# Patient Record
Sex: MALE | Race: WHITE
[De-identification: names, ages, dates, MRNs, and addresses within clinical notes are randomized per-mention and may not be internally consistent; named-entity substitution may affect disease eponyms.]

---

## 2019-07-05 ENCOUNTER — HOSPITAL ENCOUNTER (OUTPATIENT)
Dept: HOSPITAL 53 - M SFHCCLAY | Age: 69
End: 2019-07-05
Attending: FAMILY MEDICINE
Payer: MEDICARE

## 2019-07-05 ENCOUNTER — HOSPITAL ENCOUNTER (OUTPATIENT)
Dept: HOSPITAL 53 - M CLY | Age: 69
End: 2019-07-05
Attending: FAMILY MEDICINE
Payer: MEDICARE

## 2019-07-05 DIAGNOSIS — R61: Primary | ICD-10-CM

## 2019-07-05 DIAGNOSIS — R74.8: ICD-10-CM

## 2019-07-05 LAB
BASOPHILS # BLD AUTO: 0.1 10^3/UL (ref 0–0.2)
BASOPHILS NFR BLD AUTO: 0.9 % (ref 0–1)
EOSINOPHIL # BLD AUTO: 0.4 10^3/UL (ref 0–0.5)
EOSINOPHIL NFR BLD AUTO: 4.3 % (ref 0–3)
HCT VFR BLD AUTO: 37.1 % (ref 42–52)
HGB BLD-MCNC: 12.1 G/DL (ref 13.5–17.5)
LYMPHOCYTES # BLD AUTO: 0.8 10^3/UL (ref 1.5–4.5)
LYMPHOCYTES NFR BLD AUTO: 8.8 % (ref 24–44)
MCH RBC QN AUTO: 26.8 PG (ref 27–33)
MCHC RBC AUTO-ENTMCNC: 32.6 G/DL (ref 32–36.5)
MCV RBC AUTO: 82.3 FL (ref 80–96)
MONOCYTES # BLD AUTO: 0.9 10^3/UL (ref 0–0.8)
MONOCYTES NFR BLD AUTO: 10.3 % (ref 0–5)
NEUTROPHILS # BLD AUTO: 6.8 10^3/UL (ref 1.8–7.7)
NEUTROPHILS NFR BLD AUTO: 75.1 % (ref 36–66)
PLATELET # BLD AUTO: 356 10^3/UL (ref 150–450)
RBC # BLD AUTO: 4.51 10^6/UL (ref 4.3–6.1)
T4 FREE SERPL-MCNC: 1.12 NG/DL (ref 0.76–1.46)
TSH SERPL DL<=0.005 MIU/L-ACNC: 1.5 UIU/ML (ref 0.36–3.74)
WBC # BLD AUTO: 9.1 10^3/UL (ref 4–10)

## 2019-07-05 NOTE — REP
Clinical:  Night sweats .

 

Comparison: 05/24/2017 .

 

Technique:  PA and lateral.

 

Findings:

The mediastinum and cardiac silhouette are normal.  The lung fields are clear and

without acute consolidation, effusion, or pneumothorax.  The skeletal structures

are intact and normal.

 

Impression:

1.   No acute cardiopulmonary process.

## 2019-07-08 LAB
ALP BONE CFR SERPL: 28 % (ref 12–68)
ALP INTEST CFR SERPL: 0 % (ref 0–18)
ALP LIVER CFR SERPL: 72 % (ref 13–88)
ALP SERPL-CCNC: 157 IU/L (ref 39–117)

## 2019-07-30 ENCOUNTER — HOSPITAL ENCOUNTER (OUTPATIENT)
Dept: HOSPITAL 53 - M OPP | Age: 69
Discharge: HOME | End: 2019-07-30
Attending: INTERNAL MEDICINE
Payer: MEDICARE

## 2019-07-30 VITALS — DIASTOLIC BLOOD PRESSURE: 72 MMHG | SYSTOLIC BLOOD PRESSURE: 133 MMHG

## 2019-07-30 VITALS — HEIGHT: 67 IN | WEIGHT: 171 LBS | BODY MASS INDEX: 26.84 KG/M2

## 2019-07-30 DIAGNOSIS — Z86.010: ICD-10-CM

## 2019-07-30 DIAGNOSIS — D12.2: ICD-10-CM

## 2019-07-30 DIAGNOSIS — Z79.82: ICD-10-CM

## 2019-07-30 DIAGNOSIS — I10: ICD-10-CM

## 2019-07-30 DIAGNOSIS — D12.3: ICD-10-CM

## 2019-07-30 DIAGNOSIS — D12.7: ICD-10-CM

## 2019-07-30 DIAGNOSIS — Z12.11: Primary | ICD-10-CM

## 2019-07-30 DIAGNOSIS — K64.8: ICD-10-CM

## 2019-07-30 DIAGNOSIS — F32.9: ICD-10-CM

## 2019-07-30 DIAGNOSIS — Z87.891: ICD-10-CM

## 2019-07-30 DIAGNOSIS — Z79.899: ICD-10-CM

## 2019-07-30 NOTE — ROOR
________________________________________________________________________________

Patient Name: Kal Cronin            Procedure Date: 7/30/2019 8:13 AM

MRN: [id number]                          Account Number: [id number]

YOB: 1950               Age: 69

Room: [facility]                            Gender: Male

Note Status: Finalized                 

________________________________________________________________________________

 

Procedure:           Colonoscopy

Indications:         High risk colon cancer surveillance: Personal history of 

                     colonic polyps

Providers:           Juan Tony MD

Referring MD:        Charles Yu MD ([place])

Requesting Provider: 

Medicines:           Monitored Anesthesia Care

Complications:       No immediate complications.

________________________________________________________________________________

Procedure:           Pre-Anesthesia Assessment:

                     - Prior to the procedure, a History and Physical was 

                     performed, and patient medications and allergies were 

                     reviewed. The patient is competent. The risks and 

                     benefits of the procedure and the sedation options and 

                     risks were discussed with the patient. All questions were 

                     answered and informed consent was obtained. Patient 

                     identification and proposed procedure were verified by 

                     the physician, the nurse and the anesthesiologist in the 

                     procedure room. Mental Status Examination: alert and 

                     oriented. Airway Examination: normal oropharyngeal airway 

                     and neck mobility. Respiratory Examination: clear to 

                     auscultation. CV Examination: normal. Prophylactic 

                     Antibiotics: The patient does not require prophylactic 

                     antibiotics. Prior Anticoagulants: The patient has taken 

                     no previous anticoagulant or antiplatelet agents. ASA 

                     Grade Assessment: II - A patient with mild systemic 

                     disease. After reviewing the risks and benefits, the 

                     patient was deemed in satisfactory condition to undergo 

                     the procedure. The anesthesia plan was to use monitored 

                     anesthesia care (MAC). Immediately prior to 

                     administration of medications, the patient was 

                     re-assessed for adequacy to receive sedatives. The heart 

                     rate, respiratory rate, oxygen saturations, blood 

                     pressure, adequacy of pulmonary ventilation, and response 

                     to care were monitored throughout the procedure. The 

                     physical status of the patient was re-assessed after the 

                     procedure.

                     The Colonoscope was introduced through the anus and 

                     advanced to the terminal ileum, with identification of 

                     the appendiceal orifice and IC valve. The colonoscopy was 

                     performed without difficulty. The patient tolerated the 

                     procedure well. The quality of the bowel preparation was 

                     good. The terminal ileum, ileocecal valve, appendiceal 

                     orifice, and rectum were photographed. Scope insertion 

                     time was 3 minutes. Scope withdrawal time was 11 minutes. 

                     The total duration of the procedure was 14 minutes.

                                                                                

Findings:

     The perianal and digital rectal examinations were normal.

     The terminal ileum appeared normal.

     Two flat and sessile polyps were found in the ascending colon. The polyps 

     were 4 to 12 mm in size. These polyps were removed with a cold snare. 

     Resection and retrieval were complete. Verification of patient 

     identification for the specimen was done by the physician and nurse using 

     the patient's name, birth date and medical record number. Estimated blood 

     loss was minimal.

     Four sessile polyps were found in the transverse colon. The polyps were 5 

     to 7 mm in size. These polyps were removed with a cold snare. Resection 

     and retrieval were complete.

     Three sessile polyps were found in the recto-sigmoid colon. The polyps 

     were 4 to 5 mm in size. These polyps were removed with a cold snare. 

     Resection and retrieval were complete.

     Non-bleeding external and internal hemorrhoids were found during 

     retroflexion. The hemorrhoids were medium-sized.

                                                                                

Impression:          - The examined portion of the ileum was normal.

                     - Two 4 to 12 mm polyps in the ascending colon, removed 

                     with a cold snare. Resected and retrieved.

                     - Four 5 to 7 mm polyps in the transverse colon, removed 

                     with a cold snare. Resected and retrieved.

                     - Three 4 to 5 mm polyps at the recto-sigmoid colon, 

                     removed with a cold snare. Resected and retrieved.

                     - Non-bleeding external and internal hemorrhoids.

Recommendation:      - Patient has a contact number available for emergencies. 

                     The signs and symptoms of potential delayed complications 

                     were discussed with the patient. Return to normal 

                     activities tomorrow. Written discharge instructions were 

                     provided to the patient.

                     - High fiber diet.

                     - Continue present medications.

                     - Await pathology results.

                     - Repeat colonoscopy in 3 - 5 years for surveillance 

                     based on pathology results.

                     - Telephone GI clinic for pathology results in 2 weeks.

                     - Return to primary care physician.

                     - Based on the biopsy results you will receive a phone 

                     call from GI clinic in 2-3 weeks to review the pathology 

                     results AND/OR your results will be faxed to your Primary 

                     care physician.

                                                                                

 

Juan Tony MD

_______________________

Juan Tony MD

7/30/2019 9:01:26 AM

Electronically signed by Juan Tony MD

Number of Addenda: 0

 

Note Initiated On: 7/30/2019 8:13 AM

Estimated Blood Loss:

     Estimated blood loss was minimal.

## 2019-09-26 ENCOUNTER — HOSPITAL ENCOUNTER (EMERGENCY)
Dept: HOSPITAL 53 - M ED | Age: 69
Discharge: HOME | End: 2019-09-26
Payer: MEDICARE

## 2019-09-26 VITALS — WEIGHT: 177.69 LBS | HEIGHT: 67 IN | BODY MASS INDEX: 27.89 KG/M2

## 2019-09-26 VITALS — SYSTOLIC BLOOD PRESSURE: 171 MMHG | DIASTOLIC BLOOD PRESSURE: 84 MMHG

## 2019-09-26 DIAGNOSIS — Z79.82: ICD-10-CM

## 2019-09-26 DIAGNOSIS — R93.5: Primary | ICD-10-CM

## 2019-09-26 DIAGNOSIS — R10.11: ICD-10-CM

## 2019-09-26 DIAGNOSIS — I10: ICD-10-CM

## 2019-09-26 DIAGNOSIS — Z79.899: ICD-10-CM

## 2019-09-26 LAB
ALBUMIN SERPL BCG-MCNC: 2.7 GM/DL (ref 3.2–5.2)
ALT SERPL W P-5'-P-CCNC: 42 U/L (ref 12–78)
AMYLASE SERPL-CCNC: 25 U/L (ref 25–115)
APPEARANCE UR: CLEAR
BACTERIA UR QL AUTO: NEGATIVE
BASOPHILS # BLD AUTO: 0.1 10^3/UL (ref 0–0.2)
BASOPHILS NFR BLD AUTO: 0.8 % (ref 0–1)
BILIRUB CONJ SERPL-MCNC: 0.2 MG/DL (ref 0–0.2)
BILIRUB SERPL-MCNC: 0.4 MG/DL (ref 0.2–1)
BILIRUB UR QL STRIP.AUTO: NEGATIVE
EOSINOPHIL # BLD AUTO: 0.4 10^3/UL (ref 0–0.5)
EOSINOPHIL NFR BLD AUTO: 3.1 % (ref 0–3)
GLUCOSE UR QL STRIP.AUTO: NEGATIVE MG/DL
HCT VFR BLD AUTO: 35.7 % (ref 42–52)
HGB BLD-MCNC: 11.4 G/DL (ref 13.5–17.5)
HGB UR QL STRIP.AUTO: NEGATIVE
KETONES UR QL STRIP.AUTO: NEGATIVE MG/DL
LEUKOCYTE ESTERASE UR QL STRIP.AUTO: NEGATIVE
LIPASE SERPL-CCNC: 62 U/L (ref 73–393)
LYMPHOCYTES # BLD AUTO: 0.8 10^3/UL (ref 1.5–5)
LYMPHOCYTES NFR BLD AUTO: 7.4 % (ref 24–44)
MCH RBC QN AUTO: 26.6 PG (ref 27–33)
MCHC RBC AUTO-ENTMCNC: 31.9 G/DL (ref 32–36.5)
MCV RBC AUTO: 83.2 FL (ref 80–96)
MONOCYTES # BLD AUTO: 0.8 10^3/UL (ref 0–0.8)
MONOCYTES NFR BLD AUTO: 7.2 % (ref 0–5)
MUCOUS THREADS URNS QL MICRO: (no result)
NEUTROPHILS # BLD AUTO: 9.2 10^3/UL (ref 1.5–8.5)
NEUTROPHILS NFR BLD AUTO: 80.9 % (ref 36–66)
NITRITE UR QL STRIP.AUTO: NEGATIVE
PH UR STRIP.AUTO: 6 UNITS (ref 5–9)
PLATELET # BLD AUTO: 364 10^3/UL (ref 150–450)
PROT SERPL-MCNC: 6.9 GM/DL (ref 6.4–8.2)
PROT UR QL STRIP.AUTO: NEGATIVE MG/DL
RBC # BLD AUTO: 4.29 10^6/UL (ref 4.3–6.1)
RBC # UR AUTO: 2 /HPF (ref 0–3)
SP GR UR STRIP.AUTO: 1.01 (ref 1–1.03)
SQUAMOUS #/AREA URNS AUTO: 0 /HPF (ref 0–6)
UROBILINOGEN UR QL STRIP.AUTO: 0.2 MG/DL (ref 0–2)
WBC # BLD AUTO: 11.4 10^3/UL (ref 4–10)
WBC #/AREA URNS AUTO: 1 /HPF (ref 0–3)

## 2019-09-26 PROCEDURE — 83690 ASSAY OF LIPASE: CPT

## 2019-09-26 PROCEDURE — 85379 FIBRIN DEGRADATION QUANT: CPT

## 2019-09-26 PROCEDURE — 96361 HYDRATE IV INFUSION ADD-ON: CPT

## 2019-09-26 PROCEDURE — 85025 COMPLETE CBC W/AUTO DIFF WBC: CPT

## 2019-09-26 PROCEDURE — 74175 CTA ABDOMEN W/CONTRAST: CPT

## 2019-09-26 PROCEDURE — 93005 ELECTROCARDIOGRAM TRACING: CPT

## 2019-09-26 PROCEDURE — 80076 HEPATIC FUNCTION PANEL: CPT

## 2019-09-26 PROCEDURE — 99284 EMERGENCY DEPT VISIT MOD MDM: CPT

## 2019-09-26 PROCEDURE — 82150 ASSAY OF AMYLASE: CPT

## 2019-09-26 PROCEDURE — 71275 CT ANGIOGRAPHY CHEST: CPT

## 2019-09-26 PROCEDURE — 93975 VASCULAR STUDY: CPT

## 2019-09-26 PROCEDURE — 81001 URINALYSIS AUTO W/SCOPE: CPT

## 2019-09-26 PROCEDURE — 96374 THER/PROPH/DIAG INJ IV PUSH: CPT

## 2019-09-26 PROCEDURE — 80047 BASIC METABLC PNL IONIZED CA: CPT

## 2019-09-26 NOTE — REPVR
PROCEDURE INFORMATION: 

Exam: US Duplex Artery or Vein of the Abdominal and/or Reproductive Organs, 

Limited 

Exam date and time: 9/26/2019 8:04 PM 

Clinical history: 69 years old, male; Abnormal findings; Abnormal radiologic 

finding, abdomen and pelvic vessels; Additional info: Evaluate mesenteric vein 

and pancreatic confluence 



TECHNIQUE: 

Imaging protocol: Real-time duplex ultrasound scan of the arterial or venous 

flow of the abdomen and/or reproductive organs, with color Doppler flow and 

spectral waveform analysis with image documentation. Exam focused on the region 

of clinical interest. Duplex images were received to evaluate vascular 

conditions. 



COMPARISON: 

No relevant prior studies available. 



FINDINGS: 

Portal venous: Splenic vein patent. Portal vein is patent. Superior mesenteric 

vein not visualized due to overlying bowel gas. 



IMPRESSION: 

Splenic vein patent. Portal vein is patent. Superior mesenteric vein not 

visualized due to overlying bowel gas. 



Electronically signed by: Serafin Elliott On 09/26/2019  20:31:51 PM

## 2019-09-26 NOTE — REPVR
PROCEDURE INFORMATION: 

Exam: CT Angiography Abdomen With Contrast 

Exam date and time: 9/26/2019 4:50 PM 

Clinical history: 69 years old, male; Abnormal findings; Abnormal lab test; 

Elevated d-dimer; Additional info: Elevated d dimer 



TECHNIQUE: 

Imaging protocol: Computed tomographic angiography images of the abdomen with 

intravenous contrast material. 

3D rendering: MIP reconstructed images were created and reviewed. 

Radiation optimization: All CT scans at this facility use at least one of these 

dose optimization techniques: automated exposure control; mA and/or kV 

adjustment per patient size (includes targeted exams where dose is matched to 

clinical indication); or iterative reconstruction. 

Contrast material: ISOVUE 370; Contrast volume: 100 ml; Contrast route: IV;  



COMPARISON: 

No relevant prior studies available. 



FINDINGS: 



VASCULATURE: 

Aorta: The aorta demonstrates moderate atherosclerotic calcification. No 

stenosis or aneurysm. 

Celiac trunk and mesenteric arteries: No occlusion or significant stenosis. 

Renal arteries: No occlusion or significant stenosis. 

Right iliac arteries: Mild atherosclerotic changes in the right iliac arteries. 

No stenosis or aneurysm. 

Left iliac arteries: Mild atherosclerotic changes in the left iliac arteries. 

No stenosis or aneurysm. 

Portal Venous System: There is nonopacification of the splenic vein extending 

from the confluence of the portal vein inferiorly for 5.4 cm. Although the 

finding may represent a thrombosis and possibility of nonopacification artifact 

related to arterial phase imaging should be considered as well. 



ABDOMEN: 

Liver: Examination of the liver demonstrates a lobular surface contour, and 

enlargement of the left and caudate lobes, findings consistent with cirrhosis. 

Hepatic steatosis. Hepatomegaly. There are multiple hypoattenuating foci 

demonstrated throughout the liver with the largest geographic focus bridging 

the anterior segment of the right lobe and medial segment of the left lobe of 

the liver measuring 7.2 x 9.3 x 8.2 cm. Findings worrisome for metastatic 

disease. 

Gallbladder and bile ducts: Normal. No calcified stones. No ductal dilation. 

Pancreas: Normal. No ductal dilation. 

Spleen: There is moderate splenomegaly with a maximum span of 17 centimeters. 

There is a hypoattenuating mass at the inferior pole of the spleen measuring 

5.9 x 5.5 x 4.7 cm. 

Adrenals: Normal. No mass. 

Kidneys and ureters: Bilateral renal cysts measure up to 1.7 x 2.6 cm in the 

left kidney. 

Stomach and bowel: Unremarkable. No obstruction. No mucosal thickening. 

Intraperitoneal space: Unremarkable. No free air. No significant fluid 

collection. 

Bones/joints: Moderate central spinal stenosis L3-4 and mild central spinal 

stenosis L4-5.The spine demonstrates mild degenerative changes. 

Soft tissues: Unremarkable. 

Lymph nodes: Unremarkable. No enlarged lymph nodes. 



IMPRESSION: 

1. Examination of the liver demonstrates findings consistent with cirrhosis. 

Hepatic steatosis. Hepatomegaly. There are multiple hypoattenuating foci 

demonstrated throughout the liver worrisome for metastatic disease. 

2. There is moderate splenomegaly with a maximum span of 17 centimeters. There 

is a hypoattenuating mass at the inferior pole of the spleen measuring 5.9 x 

5.5 x 4.7 cm. Finding may represent a metastasis.

3. Bilateral renal cysts measure up to 1.7 x 2.6 cm in the left kidney. 

4. There is nonopacification of the splenic vein extending from the confluence 

of the portal vein inferiorly for 5.4 cm. Although the finding may represent a 

thrombosis and possibility of nonopacification artifact related to arterial 

phase imaging should be considered as well. 



Electronically signed by: Serafin Elliott On 09/26/2019  17:39:34 PM

## 2019-09-26 NOTE — REPVR
PROCEDURE INFORMATION: 

Exam: CT Angiography Chest With Contrast 

Exam date and time: 9/26/2019 4:50 PM 

Clinical history: 69 years old, male; Abnormal findings; Abnormal diagnostic 

tests; Elevated d-dimer; Additional info: Elevated d dimer 



TECHNIQUE: 

Imaging protocol: Computed tomographic angiography of the chest with 

intravenous contrast. 

3D rendering: MIP reconstructed images were created and reviewed. 

Radiation optimization: All CT scans at this facility use at least one of these 

dose optimization techniques: automated exposure control; mA and/or kV 

adjustment per patient size (includes targeted exams where dose is matched to 

clinical indication); or iterative reconstruction. 

Contrast material: ISOVUE 370; Contrast volume: 100 ml; Contrast route: IV;  



COMPARISON: 

CR CHEST 2 VIEW 7/5/2019 8:24 AM 



FINDINGS: 

Pulmonary arteries: There are no pulmonary emboli. 

Aorta: The aorta demonstrates mild atherosclerotic calcification. No 

dissection/aneurysm. 

Lungs: Bibasilar atelectasis. Lungs otherwise clear. 

Pleural space: Unremarkable. No pneumothorax. No pleural effusion. 

Heart: Unremarkable. No cardiomegaly. No pericardial effusion. 

Lymph nodes: Unremarkable. No enlarged lymph nodes. 

Bones/joints: The spine demonstrates mild degenerative changes. 

Soft tissues: Unremarkable. 



IMPRESSION: 

1. There are no pulmonary emboli. 

2. No aortic dissection/aneurysm. 

3. No acute pulmonary parenchymal abnormalities. 



Electronically signed by: Serafin Elliott On 09/26/2019  17:28:18 PM

## 2019-09-27 NOTE — ECGEPIP
[facility] - ED

                                       

                                       Test Date:    2019

Pat Name:     MARYANN DUMONT            Department:   

Patient ID:   [id number]                 Room:         -

Gender:       Male                     Technician:   cindy

:          1950               Requested By: Madhavi REGAN [facility]

Order Number: [id number]     Reading MD:   Kevin العراقي

                                 Measurements

Intervals                              Axis          

Rate:         90                       P:            80

NH:           179                      QRS:          -23

QRSD:         88                       T:            18

QT:           345                                    

QTc:          423                                    

                           Interpretive Statements

SINUS RHYTHM

BORDERLINE LEFT AXIS DEVIATION

POSSIBLE INCOMPLETE RIGHT BUNDLE BRANCH BLOCK

NO PRIORS FOR COMPARISON

Electronically Signed on 2019 0:08:26 EDT by Kevin العراقي

## 2019-09-27 NOTE — ED PDOC
Post-Departure Follow-Up


dr verdugo faxed formal report of cta abd/p and doppler flow for fu mlg Lundborg-Gray,Maja MD          Sep 27, 2019 06:39

## 2019-09-30 NOTE — ED PDOC
Post-Departure Follow-Up


9/26/19 2000  ED Consult with Dr. Groves in regards to CT finding, after 

discussion Dr. Groves agreed with my decision to consult Dr. OZ Sorenson. 


9/26/19 2015  ED Consult with Dr. OZ Sorenson, discussed CT findings, after review 

of CT Dr. Sorenson stated a Mesenteric US could be completed for further diagnostic

purposes, however it would not change the plan of care as discussed. There would

be no vascular intervention at this time. Dr. Sorenson also stated, he would need a

follow up appointment for further discussion or evaluation. Mesenteric US was 

performed, Dr. Sorenson said he would review in the morning. Impression: No Splenic

vein thrombosis noted.











Madhavi Al FN           Sep 30, 2019 06:54

## 2019-10-14 ENCOUNTER — HOSPITAL ENCOUNTER (OUTPATIENT)
Dept: HOSPITAL 53 - M IRPRO | Age: 69
End: 2019-10-14
Attending: NURSE PRACTITIONER
Payer: MEDICARE

## 2019-10-14 VITALS — DIASTOLIC BLOOD PRESSURE: 72 MMHG | SYSTOLIC BLOOD PRESSURE: 142 MMHG

## 2019-10-14 DIAGNOSIS — K76.89: Primary | ICD-10-CM

## 2019-10-14 NOTE — REP
Ultrasound-guided liver biopsy

 

 

This procedure was performed by Brianne HAINES, under the direct

supervision of Dr. Castillo.

 

The risks and benefits of the procedure were explained to the patient and

informed consent was obtained both verbally and written.  Directly prior to the

start of the procedure, a formal timeout was done in the procedure room.

 

A mass in the left lobe of the liver was localized using ultrasound guidance.

The skin was prepped and draped in a sterile fashion. 8 ml of 1% lidocaine was

used as a local anesthetic.  Using ultrasound guidance a small skin nick was made

and a 19/20 gauge coaxial needle biopsy system was inserted and advanced into the

liver. 5 core biopsy samples were obtained and sent to the lab.

 

The patient tolerated the procedure well and there were no immediate

complications. After the appropriate monitored convalescence the patient was

discharged home from the department.

 

 

Reviewed by

ZAKI Rosenthal 10/14/2019 03:02 P

Electronically Signed by

Bernabe Castillo MD 10/14/2019 06:42 P

## 2019-10-15 ENCOUNTER — HOSPITAL ENCOUNTER (OUTPATIENT)
Dept: HOSPITAL 53 - M RAD | Age: 69
End: 2019-10-15
Attending: NURSE PRACTITIONER
Payer: MEDICARE

## 2019-10-15 DIAGNOSIS — N28.1: ICD-10-CM

## 2019-10-15 DIAGNOSIS — R16.2: Primary | ICD-10-CM

## 2019-10-15 PROCEDURE — 74183 MRI ABD W/O CNTR FLWD CNTR: CPT

## 2019-10-16 NOTE — REP
MRI ABDOMEN AND LIVER WITHOUT AND WITH IV CONTRAST:

 

HISTORY:  Liver masses.  Right upper quadrant pain.

 

Comparison CT study September 26, 2019.

 

Gadolinium enhancement dose is 14 mL of intravenous ProHance.

 

MR TECHNIQUE:  Axial and coronal imaging planes were utilized.  T1- and

T2-weighted sequences include spin-echo, fast spin echo, diffusion, gradient

echo, in- and out-of-phase, and dynamically acquired post contrast images.

 

MRI FINDINGS:  There is hepatosplenomegaly.  Multiple left and right lobe hepatic

mass lesions are seen most consistent with metastatic disease.  These range in

size from 1.5 cm to a 16 cm lobulated confluent lesion.  The larger lesions show

increased T2 signal intensity with central areas of hyperintense T2 signal

suggesting necrotic changes.  The lesions do show some contrast enhancement in

the periphery although generally less avid enhancement than the surrounding

normal hepatic parenchyma.  10-minute delayed images show more central

enhancement but there is no filling and/or central scar enhancement seen.  There

is a mass lesion in the spleen as well measuring 5.8 cm in greatest diameter.

This also shows a mild contrast enhancement.  There is no evidence of pancreatic

mass.  No definite abdominal adenopathy. The splenic vein and portal vein appear

normal and patent showing contrast enhancement on MRI study.

 

There is a small cyst in the left mid kidney.  No renal mass lesion is observed.

 

IMPRESSION: Multiple hepatic masses and a splenic mass compatible with metastatic

disease.  Small cyst left kidney.  No pancreatic lesion or definite adenopathy.

 

 

Electronically Signed by

Bernabe Castillo MD 10/16/2019 01:49 P

## 2019-10-22 ENCOUNTER — HOSPITAL ENCOUNTER (OUTPATIENT)
Dept: HOSPITAL 53 - M PLARAD | Age: 69
End: 2019-10-22
Attending: INTERNAL MEDICINE
Payer: MEDICARE

## 2019-10-22 DIAGNOSIS — R93.2: Primary | ICD-10-CM

## 2019-10-22 PROCEDURE — 78815 PET IMAGE W/CT SKULL-THIGH: CPT

## 2019-10-23 NOTE — REP
REASON:  Lymphoma.

 

PRIORS:  None.

 

After the intravenous administration of 8.67 of FDG 18 triplane whole body PET/CT

was performed from the skull base to the mid thigh.

 

There is no abnormal hypermetabolic activity in the neck.  There is no abnormal

hypermetabolic activity in the chest.

 

There are numerable focal and confluent areas of grossly abnormal hypermetabolic

activity seen throughout the hepatic parenchyma and one large area of similar

characteristics in the spleen.  These SUV values range from 5 to 17.  There are

no other abnormal areas of hypermetabolic activity seen in the abdomen or pelvis.

 

 

IMPRESSION:

 

Gross abnormal hypermetabolic activity seen in the liver and spleen.  Etiology

uncertain.  Hemangiomas are not typically hypermetabolic on FDG 18 PET scanning.

 

 

 

Electronically Signed by

Josh Mesa DO 10/23/2019 04:30 P

## 2019-10-30 ENCOUNTER — HOSPITAL ENCOUNTER (OUTPATIENT)
Dept: HOSPITAL 53 - M IRPRO | Age: 69
End: 2019-10-30
Attending: INTERNAL MEDICINE
Payer: MEDICARE

## 2019-10-30 VITALS — DIASTOLIC BLOOD PRESSURE: 72 MMHG | SYSTOLIC BLOOD PRESSURE: 129 MMHG

## 2019-10-30 DIAGNOSIS — Z79.891: ICD-10-CM

## 2019-10-30 DIAGNOSIS — Z79.899: ICD-10-CM

## 2019-10-30 DIAGNOSIS — K76.89: ICD-10-CM

## 2019-10-30 DIAGNOSIS — C83.39: Primary | ICD-10-CM

## 2019-10-30 DIAGNOSIS — Z79.82: ICD-10-CM

## 2019-10-31 NOTE — REP
ULTRASOUND-GUIDED LIVER BIOPSY

 

The procedure was performed under the direct supervision of Dr. Ventura.

 

The patient has a history of multiple hepatic masses seen on a previous MRI dated

10/15/2019.  There was also gross abnormal hypermetabolic activity seen in the

liver on a previous PET scan dated 10/22/2019.

 

The risks and benefits of the procedure were explained to the patient and

informed consent was obtained.

 

A mass in the left lobe of the liver was localized using ultrasound guidance.

The skin was prepped and draped in a sterile fashion.  1% lidocaine was used as a

local anesthetic.  Using ultrasound guidance a 19/20 gauge coaxial needle biopsy

system was inserted and advanced into the mass.  Five core biopsy samples were

obtained and sent to lab.

 

The patient tolerated the procedure well and there were no immediate

complications.  After the appropriate amount of monitored convalescence the

patient was discharged from the department.

 

 

Electronically Signed by

ZAKI Harris 10/30/2019 05:07 P

Electronically Signed by

Terry Ventura MD 10/31/2019 02:35 P

## 2019-11-13 ENCOUNTER — HOSPITAL ENCOUNTER (INPATIENT)
Dept: HOSPITAL 53 - M MSPAV | Age: 69
LOS: 6 days | Discharge: HOME | DRG: 683 | End: 2019-11-19
Attending: INTERNAL MEDICINE | Admitting: INTERNAL MEDICINE
Payer: MEDICARE

## 2019-11-13 VITALS — SYSTOLIC BLOOD PRESSURE: 140 MMHG | DIASTOLIC BLOOD PRESSURE: 78 MMHG

## 2019-11-13 VITALS — BODY MASS INDEX: 26.36 KG/M2 | WEIGHT: 164.02 LBS | HEIGHT: 66 IN

## 2019-11-13 VITALS — DIASTOLIC BLOOD PRESSURE: 76 MMHG | SYSTOLIC BLOOD PRESSURE: 147 MMHG

## 2019-11-13 DIAGNOSIS — Z79.899: ICD-10-CM

## 2019-11-13 DIAGNOSIS — E88.3: Primary | ICD-10-CM

## 2019-11-13 DIAGNOSIS — Z79.52: ICD-10-CM

## 2019-11-13 DIAGNOSIS — C83.33: ICD-10-CM

## 2019-11-13 DIAGNOSIS — I10: ICD-10-CM

## 2019-11-13 DIAGNOSIS — Z79.82: ICD-10-CM

## 2019-11-13 RX ADMIN — SODIUM CHLORIDE SCH UNITS: 4.5 INJECTION, SOLUTION INTRAVENOUS at 20:14

## 2019-11-13 RX ADMIN — ONDANSETRON HYDROCHLORIDE PRN MG: 4 TABLET, FILM COATED ORAL at 20:13

## 2019-11-13 NOTE — HPEPDOC
[facility] Medical History & Physical


Date of Admission


Nov 13, 2019


Date of Service:  Nov 13, 2019


Attending Physician:  GONDAL,KHUBAIB N. MD





History and Physical


CHIEF COMPLAINT: Inpatient chemotherapy





HISTORY OF PRESENT ILLNESS: 69-year-old male with past medical history of 

hypertension, diffuse large B-cell lymphoma is sent in for direct admission by 

oncologist to receive inpatient chemotherapy. Oncologist is very concerned about

tumor lysis syndrome, for which patient will be admitted and monitored while 

undergoing chemotherapy. Patient's only complaint at this time is nausea, denies

any shortness of breath, chest pain, abdominal pain, diarrhea or constipation. 

Patient is audible started on allopurinol and prednisone in preparation for 

chemotherapy. Case discussed with Dr. Garcia in detail, will undergo R-CHOP 

therapy starting tomorrow and have labs drawn twice a day to monitor for tumor 

lysis syndrome.





10 point review of system was negative except for above





PAST MEDICAL HISTORY:


1. Diffuse large B-cell lymphoma.


2. Hypertension.





PAST SURGICAL HISTORY:


None





SOCIAL HISTORY:


Never smoker.


Social use.


Denies drug use





FAMILY HISTORY:


Mother with lung cancer





ALLERGIES: Please see below.





HOME MEDICATIONS: Please see below. 





PHYSICAL EXAMINATION:


VITAL SIGNS: Please see below.


GENERAL: No distress


HEENT: Normocephalic, atraumatic, moist mucous membranes


NECK: Supple


CARDIOVASCULAR EXAMINATION: S1, S2, no murmurs


RESPIRATORY EXAMINATION: Clear to auscultation, no wheezing


ABDOMINAL EXAMINATION: Soft, mild right upper quadrant tenderness, nondistended,

positive bowel sounds


EXTREMITIES: Trace bilateral lower extremity pitting edema


SKIN: No rash


NEUROLOGICAL EXAMINATION: Alert and oriented 3, no focal deficits 


PSYCHIATRIC EXAMINATION: Calm and cooperative





LABORATORY DATA: See below.





MICROBIOLOGY: Please see below. 





ASSESSMENT: 69-year-old male with diffuse large B-cell lymphoma will be admitted

for inpatient chemotherapy and monitored for tumor lysis syndrome.





PLAN:


1. Diffuse large B-cell lymphoma.


 Patient will start R-CHOP therapy starting tomorrow, has already been started 

on allopurinol and prednisone in the outpatient setting, we'll continue during 

hospitalization, we'll be checking tumor lysis labs (BMP, calcium, phosphorus, 

LDH and uric acid) twice a day. Plan is to monitor the patient for 3-4 days post

chemotherapy for tumor lysis syndrome. Patient will also be undergoing an 

echocardiogram as one of the chemotherapeutic agents is cardiotoxic. Patient 

will be monitored on telemetry.





2. Hypertension.


 Continue home lisinopril





DVT prophylaxis: Heparin subcutaneous


GI prophylaxis: Not needed





Vital Signs





Vital Signs








  Date Time  Temp Pulse Resp B/P (MAP) Pulse Ox O2 Delivery O2 Flow Rate FiO2


 


11/13/19 18:30 97.8 85 18 140/78 (98) 98 Room Air  











Home Medications


Scheduled


Allopurinol (Zyloprim) 300 Mg Tablet, 300 MG PO DAILY for tumor lysis 

prophylaxis


Aspirin (Ecotrin) 81 Mg Tablet.dr, 1 TAB PO DAILY for pain


Escitalopram Oxalate (Lexapro) 10 Mg Tablet, 1 TAB PO DAILY


Lisinopril (Lisinopril) 40 Mg Tablet, 40 MG PO DAILY


Lorazepam (Ativan) 1 Mg Tablet, 1 MG PO ONCE for preprocedure


   one tab one hour before procedure may repeat 


Prednisone (Prednisone) 20 Mg Tablet, 80 MG PO ASDIRECTED


   take 4 tabs daily for 5 days every 3 weeks or as directed 





Scheduled PRN


Ondansetron HCl (Ondansetron HCl) 8 Mg Tablet, 8 MG PO Q6H PRN for NAUSEA OR 

VOMITING


Oxycodone HCl (Oxycodone HCl ER) 10 Mg Tab.er.12h, 5 MG PO BIDP PRN for pain





Allergies


Coded Allergies:  


     No Known Allergies (Unverified , 7/29/19)





A-FIB/CHADSVASC


A-FIB History


Current/History of A-Fib/PAF?:  No











GONDAL,KHUBAIB N. MD           Nov 13, 2019 19:20

## 2019-11-14 VITALS — DIASTOLIC BLOOD PRESSURE: 64 MMHG | SYSTOLIC BLOOD PRESSURE: 118 MMHG

## 2019-11-14 VITALS — DIASTOLIC BLOOD PRESSURE: 74 MMHG | SYSTOLIC BLOOD PRESSURE: 137 MMHG

## 2019-11-14 VITALS — SYSTOLIC BLOOD PRESSURE: 156 MMHG | DIASTOLIC BLOOD PRESSURE: 91 MMHG

## 2019-11-14 VITALS — DIASTOLIC BLOOD PRESSURE: 62 MMHG | SYSTOLIC BLOOD PRESSURE: 117 MMHG

## 2019-11-14 LAB
ALBUMIN SERPL BCG-MCNC: 2.3 GM/DL (ref 3.2–5.2)
ALBUMIN SERPL BCG-MCNC: 2.3 GM/DL (ref 3.2–5.2)
ALT SERPL W P-5'-P-CCNC: 37 U/L (ref 12–78)
ALT SERPL W P-5'-P-CCNC: 40 U/L (ref 12–78)
BILIRUB SERPL-MCNC: 0.9 MG/DL (ref 0.2–1)
BILIRUB SERPL-MCNC: 1.2 MG/DL (ref 0.2–1)
BUN SERPL-MCNC: 16 MG/DL (ref 7–18)
BUN SERPL-MCNC: 19 MG/DL (ref 7–18)
CALCIUM SERPL-MCNC: 10 MG/DL (ref 8.8–10.2)
CALCIUM SERPL-MCNC: 10.9 MG/DL (ref 8.8–10.2)
CHLORIDE SERPL-SCNC: 101 MEQ/L (ref 98–107)
CHLORIDE SERPL-SCNC: 103 MEQ/L (ref 98–107)
CO2 SERPL-SCNC: 24 MEQ/L (ref 21–32)
CO2 SERPL-SCNC: 29 MEQ/L (ref 21–32)
CREAT SERPL-MCNC: 0.9 MG/DL (ref 0.7–1.3)
CREAT SERPL-MCNC: 0.95 MG/DL (ref 0.7–1.3)
GFR SERPL CREATININE-BSD FRML MDRD: > 60 ML/MIN/{1.73_M2} (ref 42–?)
GFR SERPL CREATININE-BSD FRML MDRD: > 60 ML/MIN/{1.73_M2} (ref 42–?)
GLUCOSE SERPL-MCNC: 124 MG/DL (ref 70–100)
GLUCOSE SERPL-MCNC: 76 MG/DL (ref 70–100)
HCT VFR BLD AUTO: 36.3 % (ref 42–52)
HGB BLD-MCNC: 11.6 G/DL (ref 13.5–17.5)
LDH SERPL L TO P-CCNC: 816 U/L (ref 87–241)
LDH SERPL L TO P-CCNC: 891 U/L (ref 87–241)
MAGNESIUM SERPL-MCNC: 2 MG/DL (ref 1.8–2.4)
MCH RBC QN AUTO: 28 PG (ref 27–33)
MCHC RBC AUTO-ENTMCNC: 32 G/DL (ref 32–36.5)
MCV RBC AUTO: 87.5 FL (ref 80–96)
PHOSPHATE SERPL-MCNC: 2.7 MG/DL (ref 2.5–4.9)
PHOSPHATE SERPL-MCNC: 4 MG/DL (ref 2.5–4.9)
PLATELET # BLD AUTO: 300 10^3/UL (ref 150–450)
POTASSIUM SERPL-SCNC: 4.3 MEQ/L (ref 3.5–5.1)
POTASSIUM SERPL-SCNC: 4.4 MEQ/L (ref 3.5–5.1)
PROT SERPL-MCNC: 6 GM/DL (ref 6.4–8.2)
PROT SERPL-MCNC: 6.1 GM/DL (ref 6.4–8.2)
RBC # BLD AUTO: 4.15 10^6/UL (ref 4.3–6.1)
SODIUM SERPL-SCNC: 139 MEQ/L (ref 136–145)
SODIUM SERPL-SCNC: 139 MEQ/L (ref 136–145)
URATE SERPL-MCNC: 10 MG/DL (ref 3.5–7.2)
URATE SERPL-MCNC: 8.4 MG/DL (ref 3.5–7.2)
WBC # BLD AUTO: 8.5 10^3/UL (ref 4–10)

## 2019-11-14 PROCEDURE — 3E04305 INTRODUCTION OF OTHER ANTINEOPLASTIC INTO CENTRAL VEIN, PERCUTANEOUS APPROACH: ICD-10-PCS | Performed by: INTERNAL MEDICINE

## 2019-11-14 PROCEDURE — 02HV33Z INSERTION OF INFUSION DEVICE INTO SUPERIOR VENA CAVA, PERCUTANEOUS APPROACH: ICD-10-PCS | Performed by: SURGERY

## 2019-11-14 RX ADMIN — LISINOPRIL SCH MG: 40 TABLET ORAL at 08:54

## 2019-11-14 RX ADMIN — ASPIRIN SCH MG: 81 TABLET ORAL at 08:54

## 2019-11-14 RX ADMIN — SODIUM CHLORIDE SCH UNITS: 4.5 INJECTION, SOLUTION INTRAVENOUS at 19:43

## 2019-11-14 RX ADMIN — SODIUM CHLORIDE SCH UNITS: 4.5 INJECTION, SOLUTION INTRAVENOUS at 08:53

## 2019-11-14 RX ADMIN — SODIUM CHLORIDE SCH MLS/HR: 9 INJECTION, SOLUTION INTRAVENOUS at 18:23

## 2019-11-14 RX ADMIN — ALLOPURINOL SCH MG: 300 TABLET ORAL at 08:54

## 2019-11-14 RX ADMIN — SODIUM CHLORIDE SCH MLS/HR: 9 INJECTION, SOLUTION INTRAVENOUS at 10:55

## 2019-11-14 NOTE — ECGEPIP
[facility]

                                       

                                       Test Date:    2019

Pat Name:     MARYANN DUMONT            Department:   

Patient ID:   [id number]                 Room:         [place] [address]

Gender:       Male                     Technician:   

:          [id number]               Requested By: XIANG BEE 

Order Number: [id number]     Reading MD:   Leilani Aldrich

                                 Measurements

Intervals                              Axis          

Rate:         81                       P:            88

UT:           190                      QRS:          -27

QRSD:         98                       T:            4

QT:           379                                    

QTc:          441                                    

                           Interpretive Statements

SINUS RHYTHM WITH FREQUENT SUPRAVENTRICULAR PREMATURE COMPLEXES

 LEFT AXIS DEVIATION

LOW QRS VOLTAGE IN PRECORDIAL LEADS

IMPROVED T WAVE ABN C/W 19 PAC NEW

Electronically Signed on 2019 6:46:47 EST by Leilani Aldrich

## 2019-11-14 NOTE — CR
DATE OF CONSULTATION: 11/14/2019

 

DIAGNOSIS:  Stage IV germinal center type diffuse large B-cell lymphoma 
involving

multifocal liver and spleen extranodal hypermetabolic sites/masses, florid B

symptoms diagnosed October 2019 status post liver biopsy.  Cytogenetics pending.

 

REQUESTING PHYSICIAN:  Dr. Gondal of hospitalist service.

 

HISTORY OF PRESENT ILLNESS

Mr. Cronin presented in early October with progressive fatigue, weight loss and

malaise without localizing signs.  Abdomen and pelvis CT revealed multiple liver

lesions.  Initial biopsy attempt was negative, followup biopsy attempt confirmed

diffuse large B-cell lymphoma germinal center type.  In the short interval under

which he has been seen in medical oncology his malaise has progressed, LDH 
risen,

and because of concern for rapidly progressive disease with spontaneous tumor

lysis syndrome he was hospitalized, started on fluids and allopurinol and

prednisone empirically.

 

Today at the bedside, Mr. Cronin replies as he does every day that he feels

"lousy" but does deny feeling worse today than yesterday.  He affirms he began

prednisone 2 days ago.  He has also been on allopurinol for 2-3 days.  He denies

fevers, chills, new breathing problems, nausea or vomiting, but his appetite is

no better.

 

LABS:  WBC 8.4, hemoglobin 11.6, hematocrit 36, platelets 300.  Electrolytes are

normal.  Uric acid is 10.  Calcium 10.9, albumin 2.3, thus corrected mild to

moderate hypocalcemia, alkaline phosphatase 648, , mildly down from 848

11/11/2019.  Phosphorus not drawn.

 

IMPRESSION:

Stage IV germinal center type diffuse large B-cell lymphoma with diffuse liver

involvement, spleen involvement, no other known sites of disease, mild

hypercalcemia, normal potassium, elevated uric acid, no phosphorus level yet.

ECOG performance status 2.

 

PLAN:

1.  Kal agrees to start chemotherapy today.  I reviewed risks, benefits and

side effects as well as the schedule and rationale for R-CHOP.  We are going to

separate the chop from the R on his first cycle giving chop day 1, rituximab day

2.  Risks and side effects including but not limited to nausea, vomiting,

alopecia, risk of tumor lysis syndrome, risk of infusion reaction which could be

life-threatening, risk of myelosuppression leading to anemia, thrombocytopenia,

or leukopenia with potential need for transfusion, the plan to give G-CSF

support, the risk of renal failure with tumor lysis syndrome, the need for IV

hydration, the risk of cardiac toxicity from doxorubicin.  The patient signed

written informed consent.

 

2.  I communicated with Dr. Gondal of hospitalist service requesting twice daily

tumor lysis labs to include phosphorus, CMP, uric acid.

 

3.  If calcium increases despite low albumin would consider pamidronate but only

extremely cautiously given risk for hypocalcemia with potential tumor lysis

syndrome.  This particular patient is at risk for developing significant tumor

lysis syndrome in the next 3-5 days.

 

4.  IV hydration with normal saline.

 

5.  Rituximab to be given tomorrow.

 

6.  The patient will complete 5 days of prednisone 80 mg daily.  He started at

home.

 

7.  Ordinarily the plan would be for pegfilgrastim given first day following

completed chemotherapy.  If Mr. Cronin remains inpatient, we can give Neupogen

300 mcg daily subcu until discharged and have him followup rapidly in oncology

clinic to continue the Neupogen or possibly Neulasta.

 

8.  I will be out of town as of tomorrow through Thursday next week.  Dr. Pires and Dr. Stahl will be covering the oncology service and I will apprise

them of this patient's inpatient status.

 

9.  Thanks to all the nurses, pharmacists, and  to help make it

possible to treat this patient inpatient on a rapid basis.

MTDD

## 2019-11-14 NOTE — ECHO
DATE OF STUDY:  11/14/2019

REFERRING PHYSICIAN:  Dr. Khubaib Gondal

INDICATION:  Chemotherapy drugs that may affect the heart.

HEIGHT:  170 cm.

WEIGHT:  76 kg.

 

2-D MEASUREMENTS:

Aortic root:  3.4 cm

Left atrium:  3.7 cm

Ventricular septum:  1.08 cm

Posterior wall:  1.13 cm

Left ventricle diastole:  4.6 cm

Inferior vena cava:  1.5 cm

 

DOPPLER MEASUREMENTS:

Aortic valve velocity:  127 cm/sec

LVOT velocity:  104 cm/sec

No aortic regurgitation

Very mild mitral regurgitation

Mitral E velocity:  55.3 cm/sec

Mitral A velocity:  67.1 cm/sec

Mitral deceleration time:  264 ms

No tricuspid regurgitation

No pulmonic regurgitation

Pulmonary artery systolic pressure 27 mmHg

 

MITRAL ANNULAR TISSUE DOPPLER:

E prime septal:  5.3 cm/sec

E prime lateral:  8.2 cm/sec

 

DESCRIPTION:  The rhythm was sinus.  Image quality was good.  This was a 2-D,

M-mode, color flow Doppler and pulse wave Doppler examination and included mitral

annular tissue Doppler.

 

CONCLUSIONS:

1.  Normal left ventricle internal dimensions and wall thickness.  Normal

regional LV wall motion and wall thickening.  Normal LV systolic function.  LVEF

63% (Pride's method of disks, biplane).  Grade 1 LV diastolic dysfunction.

2.  No pericardial effusion.

3.  Mild mitral annular calcification.  No mitral regurgitation.

4.  Mild aortic valve sclerosis with a 3-cuspid aortic valve. No aortic

regurgitation.

5.  Otherwise normal appearing echocardiogram-Doppler findings.

## 2019-11-14 NOTE — IPNPDOC
Date Seen


The patient was seen on 11/14/19.





Progress Note


HISTORY OF PRESENT ILLNESS: 69-year-old male with past medical history of 

hypertension, diffuse large B-cell lymphoma is sent in for direct admission by 

oncologist to receive inpatient chemotherapy. Oncologist is very concerned about

tumor lysis syndrome, for which patient will be admitted and monitored while 

undergoing chemotherapy. Patient's only complaint at this time is nausea, denies

any shortness of breath, chest pain, abdominal pain, diarrhea or constipation. 

Patient is audible started on allopurinol and prednisone in preparation for 

chemotherapy. Case discussed with Dr. Garcia in detail, will undergo R-CHOP 

therapy starting tomorrow and have labs drawn twice a day to monitor for tumor 

lysis syndrome.





11/14/2019


Patient resting comfortably in bed, reports nausea, no other complaints. He will

be getting an Kdtdvc-t-Xhej and chemotherapy starting today.





10 point review of system was negative except for above





PHYSICAL EXAMINATION:


VITAL SIGNS: Please see below.


GENERAL: No distress


HEENT: Normocephalic, atraumatic, moist mucous membranes


NECK: Supple


CARDIOVASCULAR EXAMINATION: S1, S2, no murmurs


RESPIRATORY EXAMINATION: Clear to auscultation, no wheezing


ABDOMINAL EXAMINATION: Soft, mild right upper quadrant tenderness, nondistended,

positive bowel sounds


EXTREMITIES: Trace bilateral lower extremity pitting edema


SKIN: No rash


NEUROLOGICAL EXAMINATION: Alert and oriented 3, no focal deficits 


PSYCHIATRIC EXAMINATION: Calm and cooperative





LABORATORY DATA: See below.





MICROBIOLOGY: Please see below. 





ASSESSMENT: 69-year-old male with diffuse large B-cell lymphoma will be admitted

for inpatient chemotherapy and monitored for tumor lysis syndrome.





PLAN:


1. Diffuse large B-cell lymphoma.


 Patient will start CHOP therapy today, followed by rituximab tomorrow, 

continue allopurinol and prednisone, tumor lysis labs (BMP, calcium, phosphorus,

LDH and uric acid) twice a day. Plan is to monitor the patient for 3-4 days post

chemotherapy for tumor lysis syndrome. TTE pending, continue telemetry 

monitoring, normal saline increased to 200 miles per hour. Will provide IV Lasix

if needed.





2. Hypertension.


 Continue home lisinopril





DVT prophylaxis: Heparin subcutaneous


GI prophylaxis: Not needed





VS, I&O, 24H, Fishbone


Vital Signs/I&O





Vital Signs








  Date Time  Temp Pulse Resp B/P (MAP) Pulse Ox O2 Delivery O2 Flow Rate FiO2


 


11/14/19 19:46 96.7 78 18 118/64 (82) 92 Room Air  


 


11/14/19 17:25       2 














I&O- Last 24 Hours up to 6 AM 


 


 11/14/19





 06:00


 


Intake Total 300 ml


 


Balance 300 ml











Laboratory Data


24H LABS


Laboratory Tests 2


11/14/19 05:33: 


Nucleated Red Blood Cells % (auto) 0.0, Anion Gap 9, Glomerular Filtration Rate 

> 60.0, Uric Acid 10.0H, Calcium Level 10.9H, Phosphorus Level 2.7, Magnesium 

Level 2.0, Total Bilirubin 1.2H, Aspartate Amino Transf (AST/SGOT) 101H, Alanine

Aminotransferase (ALT/SGPT) 37, Alkaline Phosphatase 648H, Lactate Dehydrogenase

816H, Total Protein 6.0L, Albumin 2.3L, Albumin/Globulin Ratio 0.62L


11/14/19 18:39: 


Anion Gap 12, Glomerular Filtration Rate > 60.0, Uric Acid 8.4H, Calcium Level 

10.0, Phosphorus Level 4.0#, Total Bilirubin 0.9, Aspartate Amino Transf 

(AST/SGOT) 100H, Alanine Aminotransferase (ALT/SGPT) 40, Alkaline Phosphatase 6

54H, Lactate Dehydrogenase 891H, Total Protein 6.1L, Albumin 2.3L, 

Albumin/Globulin Ratio 0.61L


CBC/BMP


Laboratory Tests


11/14/19 05:33








11/14/19 18:39

















GONDAL,KHUBAIB N. MD           Nov 14, 2019 20:15

## 2019-11-14 NOTE — ROOPDOC
[facility] Report Of Operation


Report of Operation


DATE OF PROCEDURE: 11/14/19





PREPROCEDURE DIAGNOSES: Large B-cell lymphoma and need for IV access.





POSTPROCEDURE DIAGNOSES: Same.





PROCEDURE: 


1. Ultrasound-guided access right internal jugular vein. 


2. Placement of a tunneled 22 cm Bbhawb-w-Crsf right jugular vein





SURGEON: Geeta Ozuna MD





ANESTHESIA: Local anesthesia 18 mL lidocaine with epi. Moderate intravenous 

conscious sedation was supervised by Dr. Ozuna. The patient was 

independently monitored by registered nurse assigned to the Department of 

radiology using automated blood pressure, EKG, and pulse oximetry. The detailed 

sedation record is permanently housed in the hospital information system. The 

following is the brief sedation record: Start time 16:51, stop time 17:28, 

Versed 1 mg IV, fentanyl 50 g IV, Ancef 1 g IV.





INDICATION FOR PROCEDURE: Mr. Cronin is a very pleasant 69-year-old gentleman 

with large B-cell lymphoma and need for IV access. Risks benefits and 

alternatives to Jfyfqj-k-Vtgp placement were explained to the patient he is 

agreeable to proceed. Informed consent was obtained.





INTERPRETATION: The Port-A-Cath is in good placement tunneled from the right 

chest to the right jugular with the tip freely mobile at the SVC right atrial 

junction. The catheter length is 22 cm. There is no pneumothorax.





REPORT OF OPERATION: The patient was brought to the angiographic suite in stable

condition and positioned supine on the fluoroscopic table. His right neck and 

chest were prepped and draped in a sterile fashion. A timeout was performed. 

Local anesthesia was a  to skin and subcutaneous tissue over the jugular

vein and the right jugular was then accessed with a microneedle under ultrasound

guidance. A wire was passed through this access into the central system under 

fluoroscopic guidance the needle was removed. A small incision was made at the 

access site with the skin knife and a micro-sheath was placed over the wire. The

wire was then exchanged for an O35 wire. Next a small incision was made on the 

right chest just distal to the clavicle and carried down to subcutaneous tissue.

Blunt and sharp dissection were used to develop a pocket distal to this over the

rib on the chest. The port was placed easily into the pocket. The pocket was 

then irrigated and the port catheter was tunneled from the pocket to the jugular

access site the micro-sheath was exchanged for a peel-away sheath over the wire 

using a Seldinger technique. We then removed the inner cannula and wire catheter

was cut to 22 cm and placed into the central system through the peel-away sheath

and the peel-away sheath was removed. The catheter was adjusted to make sure 

there were no kinks or bends in the catheter. The pocket was again irrigated a H

uber needle was used to access the port and it to back and flushed easily. We 

left the port accessed at the request of the nurses. We irrigated one last time 

at the jugular access site and the port pocket, and the deep tissues on the port

were approximated with running Vicryl suture and the skin was approximated with 

a running subcuticular Monocryl suture. Steri-Strips were placed the length of 

the incision and sterile dressings were applied. We heparin locked the port 

through the Howell needle and appropriate cap was placed. At the jugular access 

site the deep tissue was approximated with interrupted Vicryl sutures and the 

skin was closed with interrupted subcuticular Monocryl suture and Dermabond was 

placed at the skin. A final image was taken and the port was in good position 

with no kinks in the catheter with the tip freely mobile at the SVC right atrial

junction. There is no pneumothorax. It is okay to use the port.





ESTIMATED BLOOD LOSS: Approximately 5 mL. 





COMPLICATIONS: None. 





PLAN: It is okay to use the Alficb-j-Vtzl.











GEETA OZUNA MD         Nov 14, 2019 17:38

## 2019-11-15 VITALS — DIASTOLIC BLOOD PRESSURE: 73 MMHG | SYSTOLIC BLOOD PRESSURE: 131 MMHG

## 2019-11-15 VITALS — SYSTOLIC BLOOD PRESSURE: 121 MMHG | DIASTOLIC BLOOD PRESSURE: 61 MMHG

## 2019-11-15 VITALS — SYSTOLIC BLOOD PRESSURE: 120 MMHG | DIASTOLIC BLOOD PRESSURE: 57 MMHG

## 2019-11-15 VITALS — DIASTOLIC BLOOD PRESSURE: 65 MMHG | SYSTOLIC BLOOD PRESSURE: 127 MMHG

## 2019-11-15 VITALS — DIASTOLIC BLOOD PRESSURE: 70 MMHG | SYSTOLIC BLOOD PRESSURE: 137 MMHG

## 2019-11-15 VITALS — DIASTOLIC BLOOD PRESSURE: 82 MMHG | SYSTOLIC BLOOD PRESSURE: 145 MMHG

## 2019-11-15 VITALS — SYSTOLIC BLOOD PRESSURE: 131 MMHG | DIASTOLIC BLOOD PRESSURE: 67 MMHG

## 2019-11-15 VITALS — SYSTOLIC BLOOD PRESSURE: 120 MMHG | DIASTOLIC BLOOD PRESSURE: 66 MMHG

## 2019-11-15 VITALS — SYSTOLIC BLOOD PRESSURE: 135 MMHG | DIASTOLIC BLOOD PRESSURE: 71 MMHG

## 2019-11-15 VITALS — SYSTOLIC BLOOD PRESSURE: 145 MMHG | DIASTOLIC BLOOD PRESSURE: 83 MMHG

## 2019-11-15 VITALS — DIASTOLIC BLOOD PRESSURE: 58 MMHG | SYSTOLIC BLOOD PRESSURE: 127 MMHG

## 2019-11-15 VITALS — SYSTOLIC BLOOD PRESSURE: 125 MMHG | DIASTOLIC BLOOD PRESSURE: 60 MMHG

## 2019-11-15 VITALS — DIASTOLIC BLOOD PRESSURE: 62 MMHG | SYSTOLIC BLOOD PRESSURE: 124 MMHG

## 2019-11-15 LAB
ALBUMIN SERPL BCG-MCNC: 1.9 GM/DL (ref 3.2–5.2)
ALBUMIN SERPL BCG-MCNC: 2 GM/DL (ref 3.2–5.2)
ALT SERPL W P-5'-P-CCNC: 32 U/L (ref 12–78)
ALT SERPL W P-5'-P-CCNC: 34 U/L (ref 12–78)
BILIRUB SERPL-MCNC: 0.6 MG/DL (ref 0.2–1)
BILIRUB SERPL-MCNC: 0.6 MG/DL (ref 0.2–1)
BUN SERPL-MCNC: 23 MG/DL (ref 7–18)
BUN SERPL-MCNC: 23 MG/DL (ref 7–18)
CALCIUM SERPL-MCNC: 9.2 MG/DL (ref 8.8–10.2)
CALCIUM SERPL-MCNC: 9.3 MG/DL (ref 8.8–10.2)
CHLORIDE SERPL-SCNC: 109 MEQ/L (ref 98–107)
CHLORIDE SERPL-SCNC: 109 MEQ/L (ref 98–107)
CO2 SERPL-SCNC: 24 MEQ/L (ref 21–32)
CO2 SERPL-SCNC: 24 MEQ/L (ref 21–32)
CREAT SERPL-MCNC: 0.93 MG/DL (ref 0.7–1.3)
CREAT SERPL-MCNC: 1.08 MG/DL (ref 0.7–1.3)
GFR SERPL CREATININE-BSD FRML MDRD: > 60 ML/MIN/{1.73_M2} (ref 42–?)
GFR SERPL CREATININE-BSD FRML MDRD: > 60 ML/MIN/{1.73_M2} (ref 42–?)
GLUCOSE SERPL-MCNC: 115 MG/DL (ref 70–100)
GLUCOSE SERPL-MCNC: 147 MG/DL (ref 70–100)
HCT VFR BLD AUTO: 36 % (ref 42–52)
HGB BLD-MCNC: 11.3 G/DL (ref 13.5–17.5)
LDH SERPL L TO P-CCNC: 1701 U/L (ref 87–241)
LDH SERPL L TO P-CCNC: 880 U/L (ref 87–241)
MAGNESIUM SERPL-MCNC: 2 MG/DL (ref 1.8–2.4)
MCH RBC QN AUTO: 27.6 PG (ref 27–33)
MCHC RBC AUTO-ENTMCNC: 31.4 G/DL (ref 32–36.5)
MCV RBC AUTO: 87.8 FL (ref 80–96)
PHOSPHATE SERPL-MCNC: 3.1 MG/DL (ref 2.5–4.9)
PHOSPHATE SERPL-MCNC: 3.5 MG/DL (ref 2.5–4.9)
PLATELET # BLD AUTO: 230 10^3/UL (ref 150–450)
POTASSIUM SERPL-SCNC: 3.9 MEQ/L (ref 3.5–5.1)
POTASSIUM SERPL-SCNC: 3.9 MEQ/L (ref 3.5–5.1)
PROT SERPL-MCNC: 5.5 GM/DL (ref 6.4–8.2)
PROT SERPL-MCNC: 5.6 GM/DL (ref 6.4–8.2)
RBC # BLD AUTO: 4.1 10^6/UL (ref 4.3–6.1)
SODIUM SERPL-SCNC: 142 MEQ/L (ref 136–145)
SODIUM SERPL-SCNC: 143 MEQ/L (ref 136–145)
URATE SERPL-MCNC: 8.6 MG/DL (ref 3.5–7.2)
URATE SERPL-MCNC: 9.6 MG/DL (ref 3.5–7.2)
WBC # BLD AUTO: 6.4 10^3/UL (ref 4–10)

## 2019-11-15 PROCEDURE — 3E0430M INTRODUCTION OF MONOCLONAL ANTIBODY INTO CENTRAL VEIN, PERCUTANEOUS APPROACH: ICD-10-PCS | Performed by: INTERNAL MEDICINE

## 2019-11-15 RX ADMIN — SODIUM CHLORIDE SCH MLS/HR: 9 INJECTION, SOLUTION INTRAVENOUS at 08:18

## 2019-11-15 RX ADMIN — SODIUM CHLORIDE SCH MLS/HR: 9 INJECTION, SOLUTION INTRAVENOUS at 06:12

## 2019-11-15 RX ADMIN — FUROSEMIDE SCH MG: 10 INJECTION, SOLUTION INTRAMUSCULAR; INTRAVENOUS at 09:00

## 2019-11-15 RX ADMIN — SODIUM CHLORIDE SCH MLS/HR: 9 INJECTION, SOLUTION INTRAVENOUS at 20:46

## 2019-11-15 RX ADMIN — FUROSEMIDE SCH MG: 10 INJECTION, SOLUTION INTRAMUSCULAR; INTRAVENOUS at 17:31

## 2019-11-15 RX ADMIN — LISINOPRIL SCH MG: 40 TABLET ORAL at 08:20

## 2019-11-15 RX ADMIN — SODIUM CHLORIDE SCH UNITS: 4.5 INJECTION, SOLUTION INTRAVENOUS at 08:19

## 2019-11-15 RX ADMIN — SODIUM CHLORIDE SCH MLS/HR: 9 INJECTION, SOLUTION INTRAVENOUS at 18:29

## 2019-11-15 RX ADMIN — SODIUM CHLORIDE SCH ML: 9 INJECTION, SOLUTION INTRAMUSCULAR; INTRAVENOUS; SUBCUTANEOUS at 08:20

## 2019-11-15 RX ADMIN — SODIUM CHLORIDE SCH MLS/HR: 9 INJECTION, SOLUTION INTRAVENOUS at 00:56

## 2019-11-15 RX ADMIN — SODIUM CHLORIDE SCH UNITS: 4.5 INJECTION, SOLUTION INTRAVENOUS at 20:44

## 2019-11-15 RX ADMIN — Medication SCH UNITS: at 08:20

## 2019-11-15 RX ADMIN — ASPIRIN SCH MG: 81 TABLET ORAL at 08:20

## 2019-11-15 RX ADMIN — ALLOPURINOL SCH MG: 300 TABLET ORAL at 08:19

## 2019-11-15 RX ADMIN — SODIUM CHLORIDE SCH MLS/HR: 9 INJECTION, SOLUTION INTRAVENOUS at 12:48

## 2019-11-15 NOTE — ONC.PHACK
CHEMO ADMIN CHECKLIST


Order Contains Pt ID:  Name, 


Order on Chemo Order Form?:  Yes


Order Form Includes ALL:  Correct Tx Day, Correct Date, Correct Cycle Number


Pt ID on Order form Matches:  Pt ID on PHA Label


Med on Chemo OrderForm Matches:  PHA Label, Med Used for Preparation











[facility]     Nov 15, 2019 11:29

## 2019-11-15 NOTE — IPNPDOC
Date Seen


The patient was seen on 11/15/19.





Progress Note


HISTORY OF PRESENT ILLNESS: 69-year-old male with past medical history of 

hypertension, diffuse large B-cell lymphoma is sent in for direct admission by 

oncologist to receive inpatient chemotherapy. Oncologist is very concerned about

tumor lysis syndrome, for which patient will be admitted and monitored while 

undergoing chemotherapy. Patient's only complaint at this time is nausea, denies

any shortness of breath, chest pain, abdominal pain, diarrhea or constipation. 

Patient is audible started on allopurinol and prednisone in preparation for 

chemotherapy. Case discussed with Dr. Garcia in detail, will undergo R-CHOP 

therapy starting tomorrow and have labs drawn twice a day to monitor for tumor 

lysis syndrome.





11/14/2019


Patient resting comfortably in bed, reports nausea, no other complaints. He will

be getting an Cjmffl-c-Zoxr and chemotherapy starting today.





11/15/2019


Patient comfortable, receiving rituximab at this time, without any complaints.





10 point review of system was negative except for above





PHYSICAL EXAMINATION:


VITAL SIGNS: Please see below.


GENERAL: No distress


HEENT: Normocephalic, atraumatic, moist mucous membranes


NECK: Supple


CARDIOVASCULAR EXAMINATION: S1, S2, no murmurs


RESPIRATORY EXAMINATION: Clear to auscultation, no wheezing


ABDOMINAL EXAMINATION: Soft, mild right upper quadrant tenderness, nondistended,

positive bowel sounds


EXTREMITIES: Trace bilateral lower extremity pitting edema


SKIN: No rash


NEUROLOGICAL EXAMINATION: Alert and oriented 3, no focal deficits 


PSYCHIATRIC EXAMINATION: Calm and cooperative





LABORATORY DATA: See below.





MICROBIOLOGY: Please see below. 





ASSESSMENT: 69-year-old male with diffuse large B-cell lymphoma will be admitted

for inpatient chemotherapy and monitored for tumor lysis syndrome.





PLAN:


1. Diffuse large B-cell lymphoma.


 Patient received rituximab today, continue allopurinol and prednisone, tumor 

lysis labs (BMP, calcium, phosphorus, LDH and uric acid) twice a day. Plan is to

monitor the patient for 3-4 days post chemotherapy for tumor lysis syndrome. TTE

reviewed, normal EF, grade 1 diastolic dysfunction, continue telemetry 

monitoring, continue normal saline at 200 mL per hour, started Lasix 20 mg IV 

twice a day to augment urine output.





2. Hypertension.


 Continue home lisinopril





DVT prophylaxis: Heparin subcutaneous


GI prophylaxis: Not needed





VS, I&O, 24H, Fishbone


Vital Signs/I&O





Vital Signs








  Date Time  Temp Pulse Resp B/P (MAP) Pulse Ox O2 Delivery O2 Flow Rate FiO2


 


11/15/19 16:00 97.4 83 18 145/83 (103) 93 Room Air  


 


11/14/19 17:25       2 














I&O- Last 24 Hours up to 6 AM 


 


 11/15/19





 06:00


 


Intake Total 1892 ml


 


Output Total 1000 ml


 


Balance 892 ml











Laboratory Data


24H LABS


Laboratory Tests 2


11/14/19 18:39: 


Anion Gap 12, Glomerular Filtration Rate > 60.0, Uric Acid 8.4H, Calcium Level 

10.0, Phosphorus Level 4.0#, Total Bilirubin 0.9, Aspartate Amino Transf 

(AST/SGOT) 100H, Alanine Aminotransferase (ALT/SGPT) 40, Alkaline Phosphatase 

654H, Lactate Dehydrogenase 891H, Total Protein 6.1L, Albumin 2.3L, 

Albumin/Globulin Ratio 0.61L


11/15/19 06:01: 


Anion Gap 10, Glomerular Filtration Rate > 60.0, Uric Acid 9.6H, Calcium Level 

9.3, Phosphorus Level 3.5, Total Bilirubin 0.6, Aspartate Amino Transf 

(AST/SGOT) 87H, Alanine Aminotransferase (ALT/SGPT) 32, Alkaline Phosphatase 

518H, Lactate Dehydrogenase 880H, Total Protein 5.6L, Albumin 1.9L, 

Albumin/Globulin Ratio 0.51L, Nucleated Red Blood Cells % (auto) 0.0, Magnesium 

Level 2.0


11/15/19 10:10: Hepatitis B Surface Antigen NEGATIVE


11/15/19 16:57:


CBC/BMP


Laboratory Tests


11/14/19 18:39








11/15/19 06:01

















GONDAL,KHUBAIB N. MD           Nov 15, 2019 17:40

## 2019-11-16 VITALS — DIASTOLIC BLOOD PRESSURE: 76 MMHG | SYSTOLIC BLOOD PRESSURE: 150 MMHG

## 2019-11-16 VITALS — DIASTOLIC BLOOD PRESSURE: 74 MMHG | SYSTOLIC BLOOD PRESSURE: 156 MMHG

## 2019-11-16 VITALS — SYSTOLIC BLOOD PRESSURE: 161 MMHG | DIASTOLIC BLOOD PRESSURE: 78 MMHG

## 2019-11-16 VITALS — DIASTOLIC BLOOD PRESSURE: 79 MMHG | SYSTOLIC BLOOD PRESSURE: 170 MMHG

## 2019-11-16 VITALS — SYSTOLIC BLOOD PRESSURE: 121 MMHG | DIASTOLIC BLOOD PRESSURE: 66 MMHG

## 2019-11-16 VITALS — SYSTOLIC BLOOD PRESSURE: 148 MMHG | DIASTOLIC BLOOD PRESSURE: 74 MMHG

## 2019-11-16 VITALS — DIASTOLIC BLOOD PRESSURE: 74 MMHG | SYSTOLIC BLOOD PRESSURE: 159 MMHG

## 2019-11-16 LAB
ALBUMIN SERPL BCG-MCNC: 1.8 GM/DL (ref 3.2–5.2)
ALBUMIN SERPL BCG-MCNC: 2 GM/DL (ref 3.2–5.2)
ALT SERPL W P-5'-P-CCNC: 28 U/L (ref 12–78)
ALT SERPL W P-5'-P-CCNC: 30 U/L (ref 12–78)
BILIRUB SERPL-MCNC: 0.7 MG/DL (ref 0.2–1)
BILIRUB SERPL-MCNC: 0.7 MG/DL (ref 0.2–1)
BUN SERPL-MCNC: 22 MG/DL (ref 7–18)
BUN SERPL-MCNC: 23 MG/DL (ref 7–18)
CALCIUM SERPL-MCNC: 8.3 MG/DL (ref 8.8–10.2)
CALCIUM SERPL-MCNC: 9.3 MG/DL (ref 8.8–10.2)
CHLORIDE SERPL-SCNC: 106 MEQ/L (ref 98–107)
CHLORIDE SERPL-SCNC: 110 MEQ/L (ref 98–107)
CO2 SERPL-SCNC: 23 MEQ/L (ref 21–32)
CO2 SERPL-SCNC: 24 MEQ/L (ref 21–32)
CREAT SERPL-MCNC: 0.81 MG/DL (ref 0.7–1.3)
CREAT SERPL-MCNC: 0.86 MG/DL (ref 0.7–1.3)
GFR SERPL CREATININE-BSD FRML MDRD: > 60 ML/MIN/{1.73_M2} (ref 42–?)
GFR SERPL CREATININE-BSD FRML MDRD: > 60 ML/MIN/{1.73_M2} (ref 42–?)
GLUCOSE SERPL-MCNC: 142 MG/DL (ref 70–100)
GLUCOSE SERPL-MCNC: 90 MG/DL (ref 70–100)
HCT VFR BLD AUTO: 35.7 % (ref 42–52)
HGB BLD-MCNC: 11.4 G/DL (ref 13.5–17.5)
LDH SERPL L TO P-CCNC: 1620 U/L (ref 87–241)
LDH SERPL L TO P-CCNC: 2029 U/L (ref 87–241)
MAGNESIUM SERPL-MCNC: 1.8 MG/DL (ref 1.8–2.4)
MCH RBC QN AUTO: 27.7 PG (ref 27–33)
MCHC RBC AUTO-ENTMCNC: 31.9 G/DL (ref 32–36.5)
MCV RBC AUTO: 86.7 FL (ref 80–96)
PHOSPHATE SERPL-MCNC: 3.1 MG/DL (ref 2.5–4.9)
PHOSPHATE SERPL-MCNC: 3.5 MG/DL (ref 2.5–4.9)
PLATELET # BLD AUTO: 222 10^3/UL (ref 150–450)
POTASSIUM SERPL-SCNC: 3.6 MEQ/L (ref 3.5–5.1)
POTASSIUM SERPL-SCNC: 3.7 MEQ/L (ref 3.5–5.1)
PROT SERPL-MCNC: 5.1 GM/DL (ref 6.4–8.2)
PROT SERPL-MCNC: 5.3 GM/DL (ref 6.4–8.2)
RBC # BLD AUTO: 4.12 10^6/UL (ref 4.3–6.1)
SODIUM SERPL-SCNC: 140 MEQ/L (ref 136–145)
SODIUM SERPL-SCNC: 142 MEQ/L (ref 136–145)
URATE SERPL-MCNC: 7.4 MG/DL (ref 3.5–7.2)
URATE SERPL-MCNC: 8.3 MG/DL (ref 3.5–7.2)
WBC # BLD AUTO: 11.5 10^3/UL (ref 4–10)

## 2019-11-16 RX ADMIN — FUROSEMIDE SCH MG: 10 INJECTION, SOLUTION INTRAMUSCULAR; INTRAVENOUS at 17:03

## 2019-11-16 RX ADMIN — SODIUM CHLORIDE SCH MLS/HR: 9 INJECTION, SOLUTION INTRAVENOUS at 07:22

## 2019-11-16 RX ADMIN — DEXTROSE AND SODIUM CHLORIDE SCH MLS/HR: 5; 450 INJECTION, SOLUTION INTRAVENOUS at 12:18

## 2019-11-16 RX ADMIN — ALLOPURINOL SCH MG: 300 TABLET ORAL at 09:10

## 2019-11-16 RX ADMIN — SODIUM CHLORIDE SCH UNITS: 4.5 INJECTION, SOLUTION INTRAVENOUS at 09:08

## 2019-11-16 RX ADMIN — DEXTROSE AND SODIUM CHLORIDE SCH MLS/HR: 5; 450 INJECTION, SOLUTION INTRAVENOUS at 17:04

## 2019-11-16 RX ADMIN — Medication SCH UNITS: at 09:11

## 2019-11-16 RX ADMIN — LISINOPRIL SCH MG: 40 TABLET ORAL at 09:10

## 2019-11-16 RX ADMIN — SODIUM CHLORIDE SCH MLS/HR: 9 INJECTION, SOLUTION INTRAVENOUS at 01:49

## 2019-11-16 RX ADMIN — ASPIRIN SCH MG: 81 TABLET ORAL at 09:10

## 2019-11-16 RX ADMIN — SODIUM CHLORIDE SCH UNITS: 4.5 INJECTION, SOLUTION INTRAVENOUS at 21:03

## 2019-11-16 RX ADMIN — FILGRASTIM SCH MCG: 480 INJECTION, SOLUTION INTRAVENOUS; SUBCUTANEOUS at 09:08

## 2019-11-16 RX ADMIN — ONDANSETRON HYDROCHLORIDE PRN MG: 4 TABLET, FILM COATED ORAL at 09:10

## 2019-11-16 RX ADMIN — ONDANSETRON HYDROCHLORIDE PRN MG: 4 TABLET, FILM COATED ORAL at 21:03

## 2019-11-16 RX ADMIN — DEXTROSE AND SODIUM CHLORIDE SCH MLS/HR: 5; 450 INJECTION, SOLUTION INTRAVENOUS at 21:04

## 2019-11-16 RX ADMIN — MORPHINE SULFATE PRN MG: 2 INJECTION, SOLUTION INTRAMUSCULAR; INTRAVENOUS at 09:09

## 2019-11-16 RX ADMIN — FUROSEMIDE SCH MG: 10 INJECTION, SOLUTION INTRAMUSCULAR; INTRAVENOUS at 09:08

## 2019-11-16 RX ADMIN — SODIUM CHLORIDE SCH ML: 9 INJECTION, SOLUTION INTRAMUSCULAR; INTRAVENOUS; SUBCUTANEOUS at 09:11

## 2019-11-16 NOTE — IPNPDOC
Date Seen


The patient was seen on 11/16/19.





Progress Note


HISTORY OF PRESENT ILLNESS: 69-year-old male with past medical history of 

hypertension, diffuse large B-cell lymphoma is sent in for direct admission by 

oncologist to receive inpatient chemotherapy. Oncologist is very concerned about

tumor lysis syndrome, for which patient will be admitted and monitored while 

undergoing chemotherapy. Patient's only complaint at this time is nausea, denies

any shortness of breath, chest pain, abdominal pain, diarrhea or constipation. 

Patient is audible started on allopurinol and prednisone in preparation for 

chemotherapy. Case discussed with Dr. Garcia in detail, will undergo R-CHOP 

therapy starting tomorrow and have labs drawn twice a day to monitor for tumor 

lysis syndrome.





11/14/2019


Patient resting comfortably in bed, reports nausea, no other complaints. He will

be getting an [id number] and chemotherapy starting today.





11/15/2019


Patient comfortable, receiving rituximab at this time, without any complaints.





11/16/2019


Patient reports headache, nausea, and generalized malaise, unable to sleep 

overnight because of it. Tolerating diet, continues to have good urine output, 

no other complaints.





10 point review of system was negative except for above





PHYSICAL EXAMINATION:


VITAL SIGNS: Please see below.


GENERAL: No distress


HEENT: Normocephalic, atraumatic, moist mucous membranes


NECK: Supple


CARDIOVASCULAR EXAMINATION: S1, S2, no murmurs


RESPIRATORY EXAMINATION: Clear to auscultation, no wheezing


ABDOMINAL EXAMINATION: Soft, mild right upper quadrant tenderness, nondistended,

positive bowel sounds


EXTREMITIES: bilateral lower extremity pitting edema


SKIN: No rash


NEUROLOGICAL EXAMINATION: Alert and oriented 3, no focal deficits 


PSYCHIATRIC EXAMINATION: Calm and cooperative





LABORATORY DATA: See below.





MICROBIOLOGY: Please see below. 





ASSESSMENT: 69-year-old male with diffuse large B-cell lymphoma will be admitted

for inpatient chemotherapy and monitored for tumor lysis syndrome.





PLAN:


1. Diffuse large B-cell lymphoma.


 Status post R-CHOP, continue allopurinol and prednisone, tumor lysis labs 

(BMP, calcium, phosphorus, LDH and uric acid) twice a day. Plan is to monitor 

the patient for 3-4 days post chemotherapy for tumor lysis syndrome. TTE 

reviewed, normal EF, grade 1 diastolic dysfunction, continue telemetry mo

nitoring, IV fluids, switched to D5 half normal saline at 200 mL per hour, 

continue Lasix 20 mg IV twice a day to augment urine output, urine specific 

gravity currently at 1.01, which is our current goal.





2. Hypertension.


 Continue home lisinopril





DVT prophylaxis: Heparin subcutaneous


GI prophylaxis: Not needed





VS, I&O, 24H, Fishbone


Vital Signs/I&O





Vital Signs








  Date Time  Temp Pulse Resp B/P (MAP) Pulse Ox O2 Delivery O2 Flow Rate FiO2


 


11/16/19 12:40   16   Room Air  


 


11/16/19 12:00 97.5 93  150/76 (100) 93   


 


11/14/19 17:25       2 














I&O- Last 24 Hours up to 6 AM 


 


 11/16/19





 06:00


 


Intake Total 4950 ml


 


Output Total 4475 ml


 


Balance 475 ml











Laboratory Data


24H LABS


Laboratory Tests 2


11/15/19 16:57: 


Anion Gap 9, Glomerular Filtration Rate > 60.0, Uric Acid 8.6H, Calcium Level 

9.2, Phosphorus Level 3.1, Total Bilirubin 0.6, Aspartate Amino Transf 

(AST/SGOT) 151H, Alanine Aminotransferase (ALT/SGPT) 34, Alkaline Phosphatase 50

4H, Lactate Dehydrogenase 1701H, Total Protein 5.5L, Albumin 2.0L, 

Albumin/Globulin Ratio 0.57L


11/16/19 06:11: 


Anion Gap 9, Glomerular Filtration Rate > 60.0, Uric Acid 8.3H, Calcium Level 

9.3, Phosphorus Level 3.1, Total Bilirubin 0.7, Aspartate Amino Transf 

(AST/SGOT) 170H, Alanine Aminotransferase (ALT/SGPT) 30, Alkaline Phosphatase 

491H, Lactate Dehydrogenase 2029H, Total Protein 5.1L, Albumin 2.0L, 

Albumin/Globulin Ratio 0.65L, Nucleated Red Blood Cells % (auto) 0.0, Magnesium 

Level 1.8


11/16/19 09:30: Urine Specific [place] 1.012


CBC/BMP


Laboratory Tests


11/15/19 16:57








11/16/19 06:11

















GONDAL,KHUBAIB N. MD           Nov 16, 2019 16:20

## 2019-11-17 VITALS — DIASTOLIC BLOOD PRESSURE: 88 MMHG | SYSTOLIC BLOOD PRESSURE: 177 MMHG

## 2019-11-17 VITALS — SYSTOLIC BLOOD PRESSURE: 142 MMHG | DIASTOLIC BLOOD PRESSURE: 76 MMHG

## 2019-11-17 VITALS — DIASTOLIC BLOOD PRESSURE: 74 MMHG | SYSTOLIC BLOOD PRESSURE: 128 MMHG

## 2019-11-17 VITALS — DIASTOLIC BLOOD PRESSURE: 90 MMHG | SYSTOLIC BLOOD PRESSURE: 162 MMHG

## 2019-11-17 VITALS — SYSTOLIC BLOOD PRESSURE: 185 MMHG | DIASTOLIC BLOOD PRESSURE: 91 MMHG

## 2019-11-17 LAB
ALBUMIN SERPL BCG-MCNC: 1.7 GM/DL (ref 3.2–5.2)
ALBUMIN SERPL BCG-MCNC: 1.7 GM/DL (ref 3.2–5.2)
ALT SERPL W P-5'-P-CCNC: 28 U/L (ref 12–78)
ALT SERPL W P-5'-P-CCNC: 34 U/L (ref 12–78)
BILIRUB SERPL-MCNC: 0.4 MG/DL (ref 0.2–1)
BILIRUB SERPL-MCNC: 0.4 MG/DL (ref 0.2–1)
BUN SERPL-MCNC: 22 MG/DL (ref 7–18)
BUN SERPL-MCNC: 23 MG/DL (ref 7–18)
CALCIUM SERPL-MCNC: 8.8 MG/DL (ref 8.8–10.2)
CALCIUM SERPL-MCNC: 8.8 MG/DL (ref 8.8–10.2)
CHLORIDE SERPL-SCNC: 104 MEQ/L (ref 98–107)
CHLORIDE SERPL-SCNC: 106 MEQ/L (ref 98–107)
CO2 SERPL-SCNC: 25 MEQ/L (ref 21–32)
CO2 SERPL-SCNC: 26 MEQ/L (ref 21–32)
CREAT SERPL-MCNC: 0.68 MG/DL (ref 0.7–1.3)
CREAT SERPL-MCNC: 0.78 MG/DL (ref 0.7–1.3)
GFR SERPL CREATININE-BSD FRML MDRD: > 60 ML/MIN/{1.73_M2} (ref 42–?)
GFR SERPL CREATININE-BSD FRML MDRD: > 60 ML/MIN/{1.73_M2} (ref 42–?)
GLUCOSE SERPL-MCNC: 130 MG/DL (ref 70–100)
GLUCOSE SERPL-MCNC: 205 MG/DL (ref 70–100)
HCT VFR BLD AUTO: 31.9 % (ref 42–52)
HGB BLD-MCNC: 10.2 G/DL (ref 13.5–17.5)
LDH SERPL L TO P-CCNC: 1049 U/L (ref 87–241)
LDH SERPL L TO P-CCNC: 1172 U/L (ref 87–241)
MAGNESIUM SERPL-MCNC: 1.8 MG/DL (ref 1.8–2.4)
MCH RBC QN AUTO: 27.9 PG (ref 27–33)
MCHC RBC AUTO-ENTMCNC: 32 G/DL (ref 32–36.5)
MCV RBC AUTO: 87.4 FL (ref 80–96)
PHOSPHATE SERPL-MCNC: 2.8 MG/DL (ref 2.5–4.9)
PHOSPHATE SERPL-MCNC: 3.3 MG/DL (ref 2.5–4.9)
PLATELET # BLD AUTO: 185 10^3/UL (ref 150–450)
POTASSIUM SERPL-SCNC: 3.3 MEQ/L (ref 3.5–5.1)
POTASSIUM SERPL-SCNC: 3.4 MEQ/L (ref 3.5–5.1)
PROT SERPL-MCNC: 4.9 GM/DL (ref 6.4–8.2)
PROT SERPL-MCNC: 5.2 GM/DL (ref 6.4–8.2)
RBC # BLD AUTO: 3.65 10^6/UL (ref 4.3–6.1)
SODIUM SERPL-SCNC: 136 MEQ/L (ref 136–145)
SODIUM SERPL-SCNC: 139 MEQ/L (ref 136–145)
URATE SERPL-MCNC: 6.5 MG/DL (ref 3.5–7.2)
URATE SERPL-MCNC: 7.1 MG/DL (ref 3.5–7.2)
WBC # BLD AUTO: 17.6 10^3/UL (ref 4–10)

## 2019-11-17 RX ADMIN — FUROSEMIDE SCH MG: 10 INJECTION, SOLUTION INTRAMUSCULAR; INTRAVENOUS at 16:36

## 2019-11-17 RX ADMIN — DEXTROSE AND SODIUM CHLORIDE SCH MLS/HR: 5; 450 INJECTION, SOLUTION INTRAVENOUS at 23:59

## 2019-11-17 RX ADMIN — Medication SCH UNITS: at 08:33

## 2019-11-17 RX ADMIN — DEXTROSE AND SODIUM CHLORIDE SCH MLS/HR: 5; 450 INJECTION, SOLUTION INTRAVENOUS at 11:58

## 2019-11-17 RX ADMIN — LISINOPRIL SCH MG: 40 TABLET ORAL at 08:34

## 2019-11-17 RX ADMIN — SODIUM CHLORIDE SCH UNITS: 4.5 INJECTION, SOLUTION INTRAVENOUS at 08:34

## 2019-11-17 RX ADMIN — ONDANSETRON HYDROCHLORIDE PRN MG: 4 TABLET, FILM COATED ORAL at 12:04

## 2019-11-17 RX ADMIN — SODIUM CHLORIDE SCH ML: 9 INJECTION, SOLUTION INTRAMUSCULAR; INTRAVENOUS; SUBCUTANEOUS at 10:39

## 2019-11-17 RX ADMIN — DEXTROSE AND SODIUM CHLORIDE SCH MLS/HR: 5; 450 INJECTION, SOLUTION INTRAVENOUS at 02:01

## 2019-11-17 RX ADMIN — SODIUM CHLORIDE SCH UNITS: 4.5 INJECTION, SOLUTION INTRAVENOUS at 20:07

## 2019-11-17 RX ADMIN — FUROSEMIDE SCH MG: 10 INJECTION, SOLUTION INTRAMUSCULAR; INTRAVENOUS at 08:34

## 2019-11-17 RX ADMIN — ONDANSETRON HYDROCHLORIDE PRN MG: 4 TABLET, FILM COATED ORAL at 02:55

## 2019-11-17 RX ADMIN — MORPHINE SULFATE PRN MG: 2 INJECTION, SOLUTION INTRAMUSCULAR; INTRAVENOUS at 04:20

## 2019-11-17 RX ADMIN — DEXTROSE AND SODIUM CHLORIDE SCH MLS/HR: 5; 450 INJECTION, SOLUTION INTRAVENOUS at 06:53

## 2019-11-17 RX ADMIN — DEXTROSE AND SODIUM CHLORIDE SCH MLS/HR: 5; 450 INJECTION, SOLUTION INTRAVENOUS at 16:36

## 2019-11-17 RX ADMIN — MORPHINE SULFATE PRN MG: 2 INJECTION, SOLUTION INTRAMUSCULAR; INTRAVENOUS at 08:35

## 2019-11-17 RX ADMIN — FILGRASTIM SCH MCG: 480 INJECTION, SOLUTION INTRAVENOUS; SUBCUTANEOUS at 12:05

## 2019-11-17 RX ADMIN — ALLOPURINOL SCH MG: 300 TABLET ORAL at 08:35

## 2019-11-17 RX ADMIN — ASPIRIN SCH MG: 81 TABLET ORAL at 08:34

## 2019-11-17 NOTE — IPNPDOC
Text Note


Date of Service


The patient was seen on 11/17/19.





NOTE


Subjective:


Patient seen and examined at bedside. No acute overnight events reported. No new

medical complaints this morning. Denies chest pain, shortness of breath, 

abdominal pain, N/V/D.





Objective:


General: NAD, lying comfortably in bed


HEENT: NC/AT, EOMI


Lungs: CTA B/L


Heart: +S1S2, RRR


Abd: soft, NT, +BS


Ext: no edema





ASSESSMENT: 69-year-old male with diffuse large B-cell lymphoma will be admitted

for inpatient chemotherapy and monitored for tumor lysis syndrome.





PLAN:


1. Diffuse large B-cell lymphoma.


 Status post R-CHOP, continue allopurinol and prednisone, tumor lysis labs 

(BMP, calcium, phosphorus, LDH and uric acid) twice a day. Plan is to monitor 

the patient for 3-4 days post chemotherapy for tumor lysis syndrome. TTE 

reviewed, normal EF, grade 1 diastolic dysfunction, continue telemetry 

monitoring, IV fluids, switched to D5 half normal saline at 200 mL per hour, 

continue Lasix 20 mg IV twice a day to augment urine output, urine specific 

gravity currently at 1.01, which is our current goal.





2. Hypertension.


 Continue home lisinopril





3. DVT prophylaxis: Heparin subcutaneous





Dispo: continue to follow clinically





VS,Ada, I+O


VS, Ada, I+O


Laboratory Tests


11/16/19 16:49








11/17/19 05:56











Vital Signs








  Date Time  Temp Pulse Resp B/P (MAP) Pulse Ox O2 Delivery O2 Flow Rate FiO2


 


11/17/19 08:35   17     


 


11/17/19 08:28 98.3 77  162/90 (114) 97 Room Air  


 


11/14/19 17:25       2 














I&O- Last 24 Hours up to 6 AM 


 


 11/17/19





 06:00


 


Intake Total 6880 ml


 


Output Total 5025 ml


 


Balance 1855 ml

















TIM WILDER MD              Nov 17, 2019 09:16

## 2019-11-18 VITALS — SYSTOLIC BLOOD PRESSURE: 130 MMHG | DIASTOLIC BLOOD PRESSURE: 70 MMHG

## 2019-11-18 VITALS — SYSTOLIC BLOOD PRESSURE: 146 MMHG | DIASTOLIC BLOOD PRESSURE: 70 MMHG

## 2019-11-18 VITALS — SYSTOLIC BLOOD PRESSURE: 160 MMHG | DIASTOLIC BLOOD PRESSURE: 84 MMHG

## 2019-11-18 VITALS — DIASTOLIC BLOOD PRESSURE: 66 MMHG | SYSTOLIC BLOOD PRESSURE: 124 MMHG

## 2019-11-18 LAB
ALBUMIN SERPL BCG-MCNC: 1.8 GM/DL (ref 3.2–5.2)
ALT SERPL W P-5'-P-CCNC: 52 U/L (ref 12–78)
BILIRUB SERPL-MCNC: 0.5 MG/DL (ref 0.2–1)
BUN SERPL-MCNC: 21 MG/DL (ref 7–18)
CALCIUM SERPL-MCNC: 9.2 MG/DL (ref 8.8–10.2)
CHLORIDE SERPL-SCNC: 106 MEQ/L (ref 98–107)
CO2 SERPL-SCNC: 27 MEQ/L (ref 21–32)
CREAT SERPL-MCNC: 0.64 MG/DL (ref 0.7–1.3)
GFR SERPL CREATININE-BSD FRML MDRD: > 60 ML/MIN/{1.73_M2} (ref 42–?)
GLUCOSE SERPL-MCNC: 109 MG/DL (ref 70–100)
HCT VFR BLD AUTO: 34.3 % (ref 42–52)
HGB BLD-MCNC: 10.6 G/DL (ref 13.5–17.5)
LDH SERPL L TO P-CCNC: 857 U/L (ref 87–241)
MAGNESIUM SERPL-MCNC: 2 MG/DL (ref 1.8–2.4)
MCH RBC QN AUTO: 27.2 PG (ref 27–33)
MCHC RBC AUTO-ENTMCNC: 30.9 G/DL (ref 32–36.5)
MCV RBC AUTO: 87.9 FL (ref 80–96)
PLATELET # BLD AUTO: 214 10^3/UL (ref 150–450)
POTASSIUM SERPL-SCNC: 3.9 MEQ/L (ref 3.5–5.1)
PROT SERPL-MCNC: 5.5 GM/DL (ref 6.4–8.2)
RBC # BLD AUTO: 3.9 10^6/UL (ref 4.3–6.1)
SODIUM SERPL-SCNC: 139 MEQ/L (ref 136–145)
URATE SERPL-MCNC: 5.8 MG/DL (ref 3.5–7.2)
WBC # BLD AUTO: 20.5 10^3/UL (ref 4–10)

## 2019-11-18 RX ADMIN — SODIUM CHLORIDE SCH ML: 9 INJECTION, SOLUTION INTRAMUSCULAR; INTRAVENOUS; SUBCUTANEOUS at 10:05

## 2019-11-18 RX ADMIN — ONDANSETRON HYDROCHLORIDE PRN MG: 4 TABLET, FILM COATED ORAL at 03:01

## 2019-11-18 RX ADMIN — LISINOPRIL SCH MG: 40 TABLET ORAL at 10:03

## 2019-11-18 RX ADMIN — SODIUM CHLORIDE SCH UNITS: 4.5 INJECTION, SOLUTION INTRAVENOUS at 20:18

## 2019-11-18 RX ADMIN — ALLOPURINOL SCH MG: 300 TABLET ORAL at 10:03

## 2019-11-18 RX ADMIN — Medication SCH UNITS: at 10:04

## 2019-11-18 RX ADMIN — MORPHINE SULFATE PRN MG: 2 INJECTION, SOLUTION INTRAMUSCULAR; INTRAVENOUS at 00:18

## 2019-11-18 RX ADMIN — FILGRASTIM SCH MCG: 480 INJECTION, SOLUTION INTRAVENOUS; SUBCUTANEOUS at 09:00

## 2019-11-18 RX ADMIN — ASPIRIN SCH MG: 81 TABLET ORAL at 10:03

## 2019-11-18 RX ADMIN — ONDANSETRON HYDROCHLORIDE PRN MG: 4 TABLET, FILM COATED ORAL at 16:20

## 2019-11-18 RX ADMIN — ONDANSETRON HYDROCHLORIDE PRN MG: 4 TABLET, FILM COATED ORAL at 10:03

## 2019-11-18 RX ADMIN — SODIUM CHLORIDE SCH UNITS: 4.5 INJECTION, SOLUTION INTRAVENOUS at 10:04

## 2019-11-18 NOTE — IPNPDOC
Text Note


Date of Service


The patient was seen on 11/18/19.





NOTE


Subjective:


Patient seen and examined at bedside. No acute overnight events reported. No new

medical complaints this morning. Denies chest pain, shortness of breath, 

abdominal pain, N/V/D.





Objective:


General: NAD, lying comfortably in bed


HEENT: NC/AT, EOMI


Lungs: CTA B/L


Heart: +S1S2, RRR


Abd: soft, NT, +BS


Ext: RUE peripheral edema





ASSESSMENT: 69-year-old male with diffuse large B-cell lymphoma will be admitted

for inpatient chemotherapy and monitored for tumor lysis syndrome.





#Diffuse large B-cell lymphoma.


 Status post R-CHOP


- continue allopurinol


- continue prednisone 80 mg, completing 5 days today, then taper


- tumor lysis labs continue to improve or within normal limits


- continue Neupogen until discharge


- plan to monitor the patient for 3-4 days post chemotherapy for tumor lysis 

syndrome


- TTE reviewed, normal EF, grade 1 diastolic dysfunction, continue telemetry 

monitoring


- d/c IV fluids and lasix today





#Hypertension.


 Continue home lisinopril





#DVT prophylaxis: Heparin subcutaneous





Dispo: continue to follow clinically; anticipate d/c in 24-48 hours; rapid 

follow up with oncology on discharge





VS,Ada, I+O


VS, Ada, I+O


Laboratory Tests


11/17/19 16:36








11/18/19 05:54











Vital Signs








  Date Time  Temp Pulse Resp B/P (MAP) Pulse Ox O2 Delivery O2 Flow Rate FiO2


 


11/18/19 04:00 97.9 84 16 160/84 (109) 95 Room Air  


 


11/14/19 17:25       2 














I&O- Last 24 Hours up to 6 AM 


 


 11/18/19





 06:00


 


Intake Total 5820 ml


 


Output Total 6400 ml


 


Balance -580 ml

















TIM WILDER MD              Nov 18, 2019 08:52

## 2019-11-18 NOTE — ONC.PHACK
CHEMO ADMIN CHECKLIST


Order Contains Pt ID:  Name, 


Order on Chemo Order Form?:  Yes


Order Form Includes ALL:  Correct Tx Day, Correct Date, Correct Cycle Number


Pt ID on Order form Matches:  Pt ID on PHA Label


Med on Chemo OrderForm Matches:  PHA Label, Med Used for Preparation











[facility]     2019 21:11

## 2019-11-19 VITALS — SYSTOLIC BLOOD PRESSURE: 151 MMHG | DIASTOLIC BLOOD PRESSURE: 86 MMHG

## 2019-11-19 VITALS — DIASTOLIC BLOOD PRESSURE: 70 MMHG | SYSTOLIC BLOOD PRESSURE: 160 MMHG

## 2019-11-19 VITALS — SYSTOLIC BLOOD PRESSURE: 158 MMHG | DIASTOLIC BLOOD PRESSURE: 72 MMHG

## 2019-11-19 VITALS — DIASTOLIC BLOOD PRESSURE: 74 MMHG | SYSTOLIC BLOOD PRESSURE: 164 MMHG

## 2019-11-19 LAB
ALBUMIN SERPL BCG-MCNC: 1.9 GM/DL (ref 3.2–5.2)
ALT SERPL W P-5'-P-CCNC: 54 U/L (ref 12–78)
BILIRUB SERPL-MCNC: 0.9 MG/DL (ref 0.2–1)
BUN SERPL-MCNC: 21 MG/DL (ref 7–18)
CALCIUM SERPL-MCNC: 8.9 MG/DL (ref 8.8–10.2)
CHLORIDE SERPL-SCNC: 106 MEQ/L (ref 98–107)
CO2 SERPL-SCNC: 27 MEQ/L (ref 21–32)
CREAT SERPL-MCNC: 0.58 MG/DL (ref 0.7–1.3)
GFR SERPL CREATININE-BSD FRML MDRD: > 60 ML/MIN/{1.73_M2} (ref 42–?)
GLUCOSE SERPL-MCNC: 92 MG/DL (ref 70–100)
HCT VFR BLD AUTO: 32 % (ref 42–52)
HGB BLD-MCNC: 10.3 G/DL (ref 13.5–17.5)
LDH SERPL L TO P-CCNC: 637 U/L (ref 87–241)
MAGNESIUM SERPL-MCNC: 2 MG/DL (ref 1.8–2.4)
MCH RBC QN AUTO: 27.5 PG (ref 27–33)
MCHC RBC AUTO-ENTMCNC: 32.2 G/DL (ref 32–36.5)
MCV RBC AUTO: 85.6 FL (ref 80–96)
NT-PRO BNP: 4282 PG/ML (ref ?–125)
PLATELET # BLD AUTO: 194 10^3/UL (ref 150–450)
POTASSIUM SERPL-SCNC: 3.7 MEQ/L (ref 3.5–5.1)
PROT SERPL-MCNC: 5.5 GM/DL (ref 6.4–8.2)
RBC # BLD AUTO: 3.74 10^6/UL (ref 4.3–6.1)
SODIUM SERPL-SCNC: 139 MEQ/L (ref 136–145)
URATE SERPL-MCNC: 4.8 MG/DL (ref 3.5–7.2)
WBC # BLD AUTO: 14.5 10^3/UL (ref 4–10)

## 2019-11-19 RX ADMIN — SODIUM CHLORIDE SCH ML: 9 INJECTION, SOLUTION INTRAMUSCULAR; INTRAVENOUS; SUBCUTANEOUS at 09:00

## 2019-11-19 RX ADMIN — Medication SCH UNITS: at 10:57

## 2019-11-19 RX ADMIN — ALLOPURINOL SCH MG: 300 TABLET ORAL at 10:57

## 2019-11-19 RX ADMIN — SODIUM CHLORIDE SCH UNITS: 4.5 INJECTION, SOLUTION INTRAVENOUS at 10:57

## 2019-11-19 RX ADMIN — LISINOPRIL SCH MG: 40 TABLET ORAL at 10:57

## 2019-11-19 RX ADMIN — ASPIRIN SCH MG: 81 TABLET ORAL at 10:56

## 2019-11-19 NOTE — DS.PDOC
Discharge Summary


General


Date of Admission


Nov 13, 2019 at 18:01


Date of Discharge


11/19/2019


Attending Physician:  GONDAL,KHUBAIB N. MD





Discharge Summary


PROCEDURES PERFORMED DURING STAY: None.





ADMITTING DIAGNOSES: 


1. Tumor lysis syndrome.





DISCHARGE DIAGNOSES:


1. Tumor lysis syndrome.





COMPLICATIONS/CHIEF COMPLAINT: B-Cell Lymphoma.





HISTORY OF PRESENT ILLNESS: 69-year-old male with past nuchal history of diffuse

large B-cell lymphoma was admitted for chemotherapy and monitoring for tumor 

lysis syndrome. Patient underwent R CHOP therapy, tolerated well, chemotherapy, 

labs were monitored twice a day, patient treated with aggressive IV hydration 

along with IV Lasix to maintain adequate urine output. Patient remained stable, 

on telemetry monitoring, without any acute issues during and post chemotherapy. 

Patient is currently stable, no severe lab abnormalities, no complaint at this 

time. Patient is clinically hemodynamically stable for discharge and outpatient 

follow-up with hematology/oncology and PCP. Patient received Neupogen during 

hospitalization, will receive Neulasta at oncologist's office.





HOSPITAL COURSE: As above. 





DISCHARGE MEDICATIONS: Please see below.


 


ALLERGIES: Please see below.





PHYSICAL EXAMINATION:


VITAL SIGNS: Please see below.


GENERAL: No distress


HEENT: Normocephalic, atraumatic, moist mucous membranes


NECK: Supple


CARDIOVASCULAR EXAMINATION: S1, S2, no murmurs


RESPIRATORY EXAMINATION: Clear to auscultation, no wheezing


ABDOMINAL EXAMINATION: Soft, nontender, nondistended, positive bowel sounds


EXTREMITIES: Range of motion intact


SKIN: No rash


NEUROLOGICAL EXAMINATION: Alert and oriented 3, no focal deficits 


PSYCHIATRIC EXAMINATION: Calm and cooperative





LABORATORY DATA: Please see below.





PROGNOSIS: Fair





ACTIVITY: As tolerated.





DIET: Regular





DISCHARGE PLAN: Patient will follow up with oncologist for Neulasta and PCP in 

1-2 weeks





DISPOSITION: 01 Home, Self-Care.





DISCHARGE INSTRUCTIONS:


1. As above.





DISCHARGE CONDITION: Stable.





TIME SPENT ON DISCHARGE: Greater than 36 minutes.





Vital Signs/I&Os





Vital Signs








  Date Time  Temp Pulse Resp B/P (MAP) Pulse Ox O2 Delivery O2 Flow Rate FiO2


 


11/19/19 12:00 98.3 68 17 158/72 (100) 95 Room Air  


 


11/14/19 17:25       2 











l


I&O- Last 24 Hours up to 6 AM 


 


 11/19/19





 06:00


 


Intake Total 1940 ml


 


Output Total 1900 ml


 


Balance 40 ml











Laboratory Data


Labs 24H


Laboratory Tests 2


11/19/19 05:08: 


Nucleated Red Blood Cells % (auto) 0.0, Anion Gap 6L, Glomerular Filtration Rate

> 60.0, Uric Acid 4.8, Calcium Level 8.9, Magnesium Level 2.0, Total Bilirubin 

0.9#, Aspartate Amino Transf (AST/SGOT) 63H, Alanine Aminotransferase (ALT/SGPT)

54, Alkaline Phosphatase 465H, Lactate Dehydrogenase 637H, NT-Pro-B-Type 

Natriuretic Peptide 4282H, Total Protein 5.5L, Albumin 1.9L, Albumin/Globulin 

Ratio 0.53L


CBC/BMP


Laboratory Tests


11/19/19 05:08











Discharge Medications


Scheduled


Allopurinol (Zyloprim) 300 Mg Tablet, 300 MG PO DAILY for tumor lysis 

prophylaxis


Aspirin (Ecotrin) 81 Mg Tablet.dr, 1 TAB PO DAILY for pain, (Reported)


Escitalopram Oxalate (Lexapro) 10 Mg Tablet, 1 TAB PO DAILY, (Reported)


Lisinopril (Lisinopril) 40 Mg Tablet, 40 MG PO DAILY, (Reported)


Lorazepam (Ativan) 1 Mg Tablet, 1 MG PO ONCE for preprocedure


   one tab one hour before procedure may repeat 


Prednisone (Prednisone) 20 Mg Tablet, 80 MG PO ASDIRECTED


   take 4 tabs daily for 5 days every 3 weeks or as directed 





Scheduled PRN


Ondansetron HCl (Ondansetron HCl) 8 Mg Tablet, 8 MG PO Q6H PRN for NAUSEA OR 

VOMITING


Oxycodone HCl (Oxycodone HCl ER) 10 Mg Tab.er.12h, 5 MG PO BIDP PRN for pain, 

(Reported)





Allergies


Coded Allergies:  


     No Known Allergies (Unverified , 7/29/19)











GONDAL,KHUBAIB N. MD           Nov 19, 2019 18:19

## 2019-11-24 ENCOUNTER — HOSPITAL ENCOUNTER (INPATIENT)
Dept: HOSPITAL 53 - M ED | Age: 69
LOS: 4 days | Discharge: HOME | DRG: 809 | End: 2019-11-28
Attending: INTERNAL MEDICINE | Admitting: FAMILY MEDICINE
Payer: MEDICARE

## 2019-11-24 VITALS — DIASTOLIC BLOOD PRESSURE: 74 MMHG | SYSTOLIC BLOOD PRESSURE: 118 MMHG

## 2019-11-24 VITALS — HEIGHT: 67 IN | BODY MASS INDEX: 26.74 KG/M2 | WEIGHT: 170.35 LBS

## 2019-11-24 VITALS — DIASTOLIC BLOOD PRESSURE: 84 MMHG | SYSTOLIC BLOOD PRESSURE: 181 MMHG

## 2019-11-24 DIAGNOSIS — Z79.891: ICD-10-CM

## 2019-11-24 DIAGNOSIS — Z79.899: ICD-10-CM

## 2019-11-24 DIAGNOSIS — H35.30: ICD-10-CM

## 2019-11-24 DIAGNOSIS — I11.9: ICD-10-CM

## 2019-11-24 DIAGNOSIS — C83.33: ICD-10-CM

## 2019-11-24 DIAGNOSIS — G89.3: ICD-10-CM

## 2019-11-24 DIAGNOSIS — Z66: ICD-10-CM

## 2019-11-24 DIAGNOSIS — F32.9: ICD-10-CM

## 2019-11-24 DIAGNOSIS — C78.89: ICD-10-CM

## 2019-11-24 DIAGNOSIS — Z87.891: ICD-10-CM

## 2019-11-24 DIAGNOSIS — Z79.82: ICD-10-CM

## 2019-11-24 DIAGNOSIS — D70.9: Primary | ICD-10-CM

## 2019-11-24 DIAGNOSIS — Z79.52: ICD-10-CM

## 2019-11-24 DIAGNOSIS — K21.9: ICD-10-CM

## 2019-11-24 DIAGNOSIS — K59.00: ICD-10-CM

## 2019-11-24 DIAGNOSIS — R18.8: ICD-10-CM

## 2019-11-24 DIAGNOSIS — R73.03: ICD-10-CM

## 2019-11-24 DIAGNOSIS — E78.5: ICD-10-CM

## 2019-11-24 LAB
ALBUMIN SERPL BCG-MCNC: 2.3 GM/DL (ref 3.2–5.2)
ALT SERPL W P-5'-P-CCNC: 37 U/L (ref 12–78)
APTT BLD: 39.3 SECONDS (ref 25–38.4)
BASOPHILS # BLD AUTO: 0 10^3/UL (ref 0–0.2)
BASOPHILS NFR BLD AUTO: 4.3 % (ref 0–1)
BILIRUB CONJ SERPL-MCNC: 0.2 MG/DL (ref 0–0.2)
BILIRUB SERPL-MCNC: 0.5 MG/DL (ref 0.2–1)
BUN SERPL-MCNC: 10 MG/DL (ref 7–18)
CALCIUM SERPL-MCNC: 8.3 MG/DL (ref 8.8–10.2)
CHLORIDE SERPL-SCNC: 103 MEQ/L (ref 98–107)
CK MB CFR.DF SERPL CALC: 5.88
CK MB SERPL-MCNC: < 1 NG/ML (ref ?–3.6)
CK SERPL-CCNC: 17 U/L (ref 39–308)
CO2 SERPL-SCNC: 30 MEQ/L (ref 21–32)
CREAT SERPL-MCNC: 0.58 MG/DL (ref 0.7–1.3)
EOSINOPHIL # BLD AUTO: 0 10^3/UL (ref 0–0.5)
EOSINOPHIL NFR BLD AUTO: 8.7 % (ref 0–3)
GFR SERPL CREATININE-BSD FRML MDRD: > 60 ML/MIN/{1.73_M2} (ref 42–?)
GLUCOSE SERPL-MCNC: 81 MG/DL (ref 70–100)
HCT VFR BLD AUTO: 31.6 % (ref 42–52)
HGB BLD-MCNC: 10 G/DL (ref 13.5–17.5)
INR PPP: 1.04
LIPASE SERPL-CCNC: 26 U/L (ref 73–393)
LYMPHOCYTES # BLD AUTO: 0.2 10^3/UL (ref 1.5–5)
LYMPHOCYTES NFR BLD AUTO: 69.6 % (ref 24–44)
MCH RBC QN AUTO: 27.9 PG (ref 27–33)
MCHC RBC AUTO-ENTMCNC: 31.6 G/DL (ref 32–36.5)
MCV RBC AUTO: 88 FL (ref 80–96)
MONOCYTES # BLD AUTO: 0 10^3/UL (ref 0–0.8)
MONOCYTES NFR BLD AUTO: 13 % (ref 0–5)
NEUTROPHILS # BLD AUTO: 0 10^3/UL (ref 1.5–8.5)
NEUTROPHILS NFR BLD AUTO: 4.4 % (ref 36–66)
PLATELET # BLD AUTO: 110 10^3/UL (ref 150–450)
POTASSIUM SERPL-SCNC: 3.8 MEQ/L (ref 3.5–5.1)
PROT SERPL-MCNC: 6.1 GM/DL (ref 6.4–8.2)
PROTHROMBIN TIME: 13.3 SECONDS (ref 11.8–14)
RBC # BLD AUTO: 3.59 10^6/UL (ref 4.3–6.1)
SODIUM SERPL-SCNC: 138 MEQ/L (ref 136–145)
TROPONIN I SERPL-MCNC: < 0.02 NG/ML (ref ?–0.1)
WBC # BLD AUTO: 0.2 10^3/UL (ref 4–10)

## 2019-11-24 RX ADMIN — GENTAMICIN SULFATE SCH MLS/HR: 80 INJECTION, SOLUTION INTRAVENOUS at 18:09

## 2019-11-24 RX ADMIN — POLYETHYLENE GLYCOL 3350 SCH PKT: 17 POWDER, FOR SOLUTION ORAL at 20:30

## 2019-11-24 RX ADMIN — DEXTROSE MONOHYDRATE SCH MLS/HR: 5 INJECTION INTRAVENOUS at 16:48

## 2019-11-24 RX ADMIN — MORPHINE SULFATE PRN MG: 2 INJECTION, SOLUTION INTRAMUSCULAR; INTRAVENOUS at 13:50

## 2019-11-24 RX ADMIN — ENOXAPARIN SODIUM SCH MG: 40 INJECTION SUBCUTANEOUS at 20:30

## 2019-11-24 RX ADMIN — FILGRASTIM SCH MCG: 480 INJECTION, SOLUTION INTRAVENOUS; SUBCUTANEOUS at 16:48

## 2019-11-24 RX ADMIN — DOCUSATE SODIUM,SENNOSIDES SCH TAB: 50; 8.6 TABLET, FILM COATED ORAL at 20:30

## 2019-11-24 RX ADMIN — MORPHINE SULFATE PRN MG: 2 INJECTION, SOLUTION INTRAMUSCULAR; INTRAVENOUS at 12:22

## 2019-11-24 RX ADMIN — MORPHINE SULFATE PRN MG: 2 INJECTION, SOLUTION INTRAMUSCULAR; INTRAVENOUS at 15:10

## 2019-11-24 RX ADMIN — MORPHINE SULFATE PRN MG: 4 INJECTION INTRAVENOUS at 18:10

## 2019-11-24 RX ADMIN — MORPHINE SULFATE PRN MG: 2 INJECTION, SOLUTION INTRAMUSCULAR; INTRAVENOUS at 10:46

## 2019-11-24 RX ADMIN — SODIUM CHLORIDE, POTASSIUM CHLORIDE, SODIUM LACTATE AND CALCIUM CHLORIDE SCH MLS/HR: 600; 310; 30; 20 INJECTION, SOLUTION INTRAVENOUS at 16:16

## 2019-11-24 RX ADMIN — PANTOPRAZOLE SODIUM SCH MG: 40 TABLET, DELAYED RELEASE ORAL at 16:16

## 2019-11-24 NOTE — REP
REASON: Abdominal pain.

 

PRIORS:  None.

 

CONTRAST: 100 mL Isovue 370.

 

There is respiratory motion artifact obscuring the detail of the lung base

images.  Mild dependent subsegmental atelectatic change is present with possible

tiny pleural effusions. These findings represent a change from the prior chest CT

of 09/26/2019.

 

There are multiple mixed enhancing low density masses throughout the liver too

numerous to count or individually assessed.  They vary in size from less than 1

cm to coalescing greater than 11 cm.  Although technically different than the CTA

abdomen 09/26/2019, these abnormalities were present on the prior exam.  There is

a small amount of ascites.  The gallbladder is within normal limits. There is a

small amount of fluid in the lesser sac.  The pancreas is unchanged.  The adrenal

glands and kidneys are unchanged.  There is a left renal cyst, status quo.  There

is no significant change in the appearance of the abdominal aorta or periaortic

regions.  There was no significant change in the appearance of bowel loops.

 

CT PELVIS:

 

There is free pelvic fluid.  The bowel loops are within normal limits.  There is

no evidence of a pelvic mass or adenopathy.  There is a small amount of fluid

trapped in the right inguinal canal.  This was not imaged on the prior exam.

 

Bone window technique throughout the exam again shows spinal, hip, sacroiliac

joint degenerative changes.

 

IMPRESSION:

 

1. There is evidence of hepatic neoplasm . Metastasis or primary

 

2.  Ascites as described above.

 

3.  Tiny bilateral pleural effusions.

 

4.  Other findings as described above.

 

 

Electronically Signed by

Josh Mesa DO 11/24/2019 12:26 P

## 2019-11-24 NOTE — REP
REASON:  Abdominal pain.

 

COMPARISON:   07/05/2019

 

The technique utilized in obtaining the radiograph has magnified the cardiac

silhouette and accentuated the interstitial markings.

 

The superior mediastinal structures are midline.  The cardiac silhouette is

unremarkable in size, shape, and position. The diaphragmatic surfaces of the

lungs are regular, and the costophrenic angles are clear.  The pulmonary fields

are clear.  The imaged osseous structures are intact.

 

A Mediport device has been placed since the last exam. The tip is in the superior

vena cava.

 

The lung fields are hypo-expanded.

 

IMPRESSION:

 

There is no acute cardiopulmonary disease.

 

 

Electronically Signed by

Josh Mesa DO 11/24/2019 11:43 A

## 2019-11-24 NOTE — ECGEPIP
[facility] - ED

                                       

                                       Test Date:    2019

Pat Name:     MARYANN DUMONT            Department:   

Patient ID:   [id number]                 Room:         -

Gender:       Male                     Technician:   BRAYAN

:          1950               Requested By: Maja Lundborg-Gray 

Order Number: [id number]     Reading MD:   Maja Lundborg-Gray

                                 Measurements

Intervals                              Axis          

Rate:         69                       P:            32

MN:           189                      QRS:          -16

QRSD:         90                       T:            19

QT:           396                                    

QTc:          427                                    

                           Interpretive Statements

SINUS RHYTHM WITH OCCASIONAL SUPRAVENTRICULAR PREMATURE COMPLEXES

LAD

NONSPECIFIC ST T WAVE CHANGES

 

 19 RATE DECREASED

NONSPECIFIC ST T WAVE CHANGES

Electronically Signed on 2019 19:15:23 EST by Maja Lundborg-Gray

## 2019-11-24 NOTE — HPE
DATE OF ADMISSION:  11/24/2019

 

PRIMARY CARE PROVIDER:  Dr. Yu

 

CHIEF COMPLAINT:   Abdominal pain.

 

HISTORY:  Kal Cronin is a 69-year-old being treated for diffuse large B cell

lymphoma, just hospitalized on 10/13/2019.  He began having abdominal pain the

evening after his discharge, pain got progressively worse.  He has been

constipated.  He thinks that the pain is from more than constipation.  He has not

had any fevers or chills.  He is nauseated.  Denies any rectal bleeding.

 

He has a past history of stage IV germinal cell type diffuse large B cell

lymphoma and on active chemotherapy through Dr. Lilibeth Garcia.  He had a liver biopsy

that made the diagnosis in October 2019.

 

I reviewed his outpatient records.  He has a history of hypertensive heart

disease, prediabetes, hyperlipidemia, macular degeneration and some depression.

At his last outpatient visit he was taking Lexapro 10 mg daily, Lisinopril 40 mg

daily and aspirin.

 

SOCIAL HISTORY:   He quit smoking over 10 years ago.  Denies alcohol use.  He is

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

 

ALLERGIES:  None known.

 

FAMILY HISTORY:   Mother had some unspecified cancer.  He has three healthy

daughters.

 

IMMUNIZATIONS:  He has had his Prevnar 09/2018, Pneumo 23 10/2014.

 

MEDICATIONS:

- Tylenol as needed

- allopurinol 300 mg daily

- aspirin 81 mg daily

- Lexapro 10 mg daily

- Lisinopril 40 mg daily

- Zofran 8 mg every 6 hours as needed

- oxycodone ER 10 mg every 12 hours

- prednisone 80 mg daily

 

REVIEW OF SYSTEMS:  As above, otherwise negative.

 

PHYSICAL EXAMINATION:

VITAL SIGNS:  98 degrees, 152/82, pulse 68, respirations 18, oxygen saturation

97%.

GENERAL APPEARANCE:  Chronically ill-appearing, resting in bed.  No distress.

HEENT:  Pupils are equal and reactive to light.  Tympanic membranes and

oropharynx benign.  Neck with no masses.

LUNGS:  Clear.

HEART:  Regular rhythm.  No murmur.

ABDOMEN:  Obese.  Soft, diffusely tender, mildly distended.  No guarding or

rebound.

EXTREMITIES:  No clubbing or cyanosis.  Trace peripheral edema of the left leg

and 1+ of the right leg.  Moves arms and legs with equal strength.  No warmth,

redness or swelling of the joints.

 

LABORATORIES:

White count 0.2 with 4% neutrophils for an absolute neutrophil count of 8,

hemoglobin 10, platelets 110.  Sodium 138, potassium 3.8, BUN 10, creatinine 0.5,

glucose 81.

 

Urinalysis looks clear.

 

Chest x-ray shows no active disease.

 

CT scan of the abdomen and pelvis showed diffuse poorly distributed masses in the

liver.  Small amount of ascites.  Gallbladder normal.  Pancreas normal.  No

hernias.

 

IMPRESSION:

1.  Critical neutropenia.  Absolute neutrophil count of 8.  The patient will be

admitted to isolation bed. I have a call in for the on-call oncologist, Dr. Stahl,

who is on call this weekend.  The patient has already received a dose of Zosyn.

We will defer antibiotic choice to oncology.  Neupogen will be ordered.  Serial

complete blood count (CBC) with differential will be ordered.  Blood cultures

have been obtained.  Urine will be obtained for culture.  Chest x-ray shows no

infiltrate.

 

2.  B cell lymphoma.  Receiving chemotherapy.  We will get phosphorous, uric

acid, daily CMP.

 

3.  Abdominal pain.  Analgesia has been ordered.  Bowel care has been ordered.

 

4.  Hypertension.  Continue his Lisinopril, watch renal function on the

angiotensin-converting-enzyme (ACE) inhibitor.  IV fluids have been ordered.

 

5.  History of depression.  Continue Lexapro to avoid SSRI withdrawal syndrome.

 

 

ADDENDUM:

 

I spoke with Dr. Stahl, who is the locums on call for oncology.  The case was

discussed.  He agreed with Neupogen.  I have given him 40 mcg times one.  He

advises Fortaz 1 gram every 8 hours and gentamicin for gram-negative coverage and

I am ordering that as well. I asked him to see the patient today.

## 2019-11-25 VITALS — SYSTOLIC BLOOD PRESSURE: 128 MMHG | DIASTOLIC BLOOD PRESSURE: 67 MMHG

## 2019-11-25 VITALS — SYSTOLIC BLOOD PRESSURE: 131 MMHG | DIASTOLIC BLOOD PRESSURE: 71 MMHG

## 2019-11-25 VITALS — SYSTOLIC BLOOD PRESSURE: 122 MMHG | DIASTOLIC BLOOD PRESSURE: 73 MMHG

## 2019-11-25 VITALS — DIASTOLIC BLOOD PRESSURE: 76 MMHG | SYSTOLIC BLOOD PRESSURE: 126 MMHG

## 2019-11-25 VITALS — DIASTOLIC BLOOD PRESSURE: 74 MMHG | SYSTOLIC BLOOD PRESSURE: 155 MMHG

## 2019-11-25 VITALS — SYSTOLIC BLOOD PRESSURE: 114 MMHG | DIASTOLIC BLOOD PRESSURE: 70 MMHG

## 2019-11-25 LAB
ALBUMIN SERPL BCG-MCNC: 2.2 GM/DL (ref 3.2–5.2)
ALT SERPL W P-5'-P-CCNC: 27 U/L (ref 12–78)
BASOPHILS # BLD AUTO: 0 10^3/UL (ref 0–0.2)
BASOPHILS NFR BLD AUTO: 2.2 % (ref 0–1)
BILIRUB SERPL-MCNC: 0.5 MG/DL (ref 0.2–1)
BUN SERPL-MCNC: 7 MG/DL (ref 7–18)
CALCIUM SERPL-MCNC: 8.5 MG/DL (ref 8.8–10.2)
CHLORIDE SERPL-SCNC: 104 MEQ/L (ref 98–107)
CO2 SERPL-SCNC: 29 MEQ/L (ref 21–32)
CREAT SERPL-MCNC: 0.63 MG/DL (ref 0.7–1.3)
EOSINOPHIL # BLD AUTO: 0 10^3/UL (ref 0–0.5)
EOSINOPHIL NFR BLD AUTO: 6.7 % (ref 0–3)
GFR SERPL CREATININE-BSD FRML MDRD: > 60 ML/MIN/{1.73_M2} (ref 42–?)
GLUCOSE SERPL-MCNC: 77 MG/DL (ref 70–100)
HCT VFR BLD AUTO: 31 % (ref 42–52)
HGB BLD-MCNC: 10 G/DL (ref 13.5–17.5)
LYMPHOCYTES # BLD AUTO: 0.2 10^3/UL (ref 1.5–5)
LYMPHOCYTES NFR BLD AUTO: 51.1 % (ref 24–44)
MAGNESIUM SERPL-MCNC: 1.8 MG/DL (ref 1.8–2.4)
MCH RBC QN AUTO: 28 PG (ref 27–33)
MCHC RBC AUTO-ENTMCNC: 32.3 G/DL (ref 32–36.5)
MCV RBC AUTO: 86.8 FL (ref 80–96)
MONOCYTES # BLD AUTO: 0 10^3/UL (ref 0–0.8)
MONOCYTES NFR BLD AUTO: 8.9 % (ref 0–5)
NEUTROPHILS # BLD AUTO: 0.1 10^3/UL (ref 1.5–8.5)
NEUTROPHILS NFR BLD AUTO: 31.1 % (ref 36–66)
PLATELET # BLD AUTO: 129 10^3/UL (ref 150–450)
POTASSIUM SERPL-SCNC: 3.7 MEQ/L (ref 3.5–5.1)
PROT SERPL-MCNC: 6 GM/DL (ref 6.4–8.2)
RBC # BLD AUTO: 3.57 10^6/UL (ref 4.3–6.1)
SODIUM SERPL-SCNC: 138 MEQ/L (ref 136–145)
URATE SERPL-MCNC: 3.1 MG/DL (ref 3.5–7.2)
WBC # BLD AUTO: 0.5 10^3/UL (ref 4–10)

## 2019-11-25 RX ADMIN — DOCUSATE SODIUM,SENNOSIDES SCH TAB: 50; 8.6 TABLET, FILM COATED ORAL at 08:36

## 2019-11-25 RX ADMIN — DIATRIZOATE MEGLUMINE AND DIATRIZOATE SODIUM SCH ML: 600; 100 SOLUTION ORAL; RECTAL at 20:00

## 2019-11-25 RX ADMIN — GENTAMICIN SULFATE SCH MLS/HR: 80 INJECTION, SOLUTION INTRAVENOUS at 06:27

## 2019-11-25 RX ADMIN — DOCUSATE SODIUM,SENNOSIDES SCH TAB: 50; 8.6 TABLET, FILM COATED ORAL at 20:37

## 2019-11-25 RX ADMIN — GENTAMICIN SULFATE SCH MLS/HR: 80 INJECTION, SOLUTION INTRAVENOUS at 18:06

## 2019-11-25 RX ADMIN — MORPHINE SULFATE PRN MG: 4 INJECTION INTRAVENOUS at 06:26

## 2019-11-25 RX ADMIN — POLYETHYLENE GLYCOL 3350 SCH PKT: 17 POWDER, FOR SOLUTION ORAL at 20:36

## 2019-11-25 RX ADMIN — ASPIRIN SCH MG: 81 TABLET ORAL at 08:36

## 2019-11-25 RX ADMIN — HYDROCODONE BITARTRATE AND ACETAMINOPHEN PRN TAB: 5; 325 TABLET ORAL at 18:52

## 2019-11-25 RX ADMIN — ONDANSETRON PRN MG: 4 TABLET, ORALLY DISINTEGRATING ORAL at 18:53

## 2019-11-25 RX ADMIN — FILGRASTIM SCH MCG: 480 INJECTION, SOLUTION INTRAVENOUS; SUBCUTANEOUS at 10:39

## 2019-11-25 RX ADMIN — ENOXAPARIN SODIUM SCH MG: 40 INJECTION SUBCUTANEOUS at 20:37

## 2019-11-25 RX ADMIN — PANTOPRAZOLE SODIUM SCH MG: 40 TABLET, DELAYED RELEASE ORAL at 08:36

## 2019-11-25 RX ADMIN — DEXTROSE MONOHYDRATE SCH MLS/HR: 5 INJECTION INTRAVENOUS at 17:08

## 2019-11-25 RX ADMIN — MORPHINE SULFATE PRN MG: 4 INJECTION INTRAVENOUS at 22:35

## 2019-11-25 RX ADMIN — ESCITALOPRAM OXALATE SCH MG: 10 TABLET, FILM COATED ORAL at 08:36

## 2019-11-25 RX ADMIN — DEXTROSE MONOHYDRATE SCH MLS/HR: 5 INJECTION INTRAVENOUS at 08:36

## 2019-11-25 RX ADMIN — DEXTROSE MONOHYDRATE SCH MLS/HR: 5 INJECTION INTRAVENOUS at 00:12

## 2019-11-25 RX ADMIN — HYDROCODONE BITARTRATE AND ACETAMINOPHEN PRN TAB: 5; 325 TABLET ORAL at 10:38

## 2019-11-25 RX ADMIN — SODIUM CHLORIDE, POTASSIUM CHLORIDE, SODIUM LACTATE AND CALCIUM CHLORIDE SCH MLS/HR: 600; 310; 30; 20 INJECTION, SOLUTION INTRAVENOUS at 02:00

## 2019-11-25 RX ADMIN — LISINOPRIL SCH MG: 40 TABLET ORAL at 10:39

## 2019-11-25 RX ADMIN — POLYETHYLENE GLYCOL 3350 SCH PKT: 17 POWDER, FOR SOLUTION ORAL at 08:36

## 2019-11-25 RX ADMIN — SODIUM CHLORIDE, POTASSIUM CHLORIDE, SODIUM LACTATE AND CALCIUM CHLORIDE SCH MLS/HR: 600; 310; 30; 20 INJECTION, SOLUTION INTRAVENOUS at 08:36

## 2019-11-25 RX ADMIN — MORPHINE SULFATE PRN MG: 4 INJECTION INTRAVENOUS at 00:13

## 2019-11-25 RX ADMIN — ALLOPURINOL SCH MG: 300 TABLET ORAL at 08:36

## 2019-11-25 RX ADMIN — MORPHINE SULFATE PRN MG: 4 INJECTION INTRAVENOUS at 11:52

## 2019-11-25 RX ADMIN — DIATRIZOATE MEGLUMINE AND DIATRIZOATE SODIUM SCH ML: 600; 100 SOLUTION ORAL; RECTAL at 20:30

## 2019-11-25 NOTE — REPVR
PROCEDURE INFORMATION: 

Exam: MR Thoracic Spine Without and With Contrast 

Exam date and time: 11/25/2019 10:04 PM 

Age: 69 years old 

Clinical history: Pain and condition or disease; Cancer, metastatic/secondary 

to thoracic bone; Pain in thoracic spine; With radiculopathy; Bilateral; 

Patient HX: Mid back pain, HX CA; Additional info: Lymphoma t12 distribution 

pain rule out spine involvement 



TECHNIQUE: 

Imaging protocol: Multiplanar magnetic resonance images of the thoracic spine 

without and with intravenous contrast. 

Contrast material: PROHANCE; Contrast volume: 15 ml; Contrast route: 22G ANGIO; 

 



COMPARISON: 

No relevant prior studies available. 



FINDINGS: 

Vertebrae: Exaggerated thoracic kyphosis with maintained alignment and 

vertebral body heights. T2 hyperintense, enhancing area abutting the left T6 

vertebral body measuring 2 cm, suspicious for a paraspinous metastases/lymph 

node. 

Marrow: Slight T1 signal hypointensity within the marrow of the lower thoracic 

spine from T9-T12 suggesting a marrow replacing process. 

Spinal cord: Normal signal. No cord compression. 

Discs/Spinal canal/Neural foramina: Nonspecific T7, T2 signal hyperintensity, 

adjacent to the endplate with minimal enhancement, question osseous metastases 

versus Modic type I inflammatory endplate degenerative change. C6-C7 disc 

bulging with mild stenosis. Mild degenerative disc and joint disease. T11-T12 

disc bulge and central disc protrusion causes mild spinal, and foraminal 

stenosis. 



Pleural space: Small pleural effusions. 

Soft tissues: Unremarkable. 



IMPRESSION: 

1. Nonspecific T7, T2 signal hyperintensity, adjacent to the endplate with 

minimal enhancement, question osseous metastases versus Modic type I 

inflammatory endplate degenerative change. 

2. Slight T1 signal hypointensity within the marrow of the lower thoracic spine 

from T9-T12 suggesting a marrow replacing process. 

3. T2 hyperintense, enhancing area abutting the left T6 vertebral body 

measuring 2 cm, suspicious for a paraspinous metastases/lymph node. 

4. T11-T12 disc bulge and central disc protrusion causes mild spinal, and 

foraminal stenosis. 



Electronically signed by: Chaitanya Buckner On 11/25/2019  22:42:25 PM

## 2019-11-25 NOTE — IPN
DATE:  11/25/2019

 

Kal is seen in ICU.  He was admitted with critical neutropenia and abdominal

pain. His abdominal pain is a little better.  He has still not had a bowel

movement though he "feels like it's close."  I consulted Dr. Taha locum tenens

covering hematology/oncology yesterday.  We discussed the case.  I do not see any

notes and the patient does not think he has been seen yet.  He has no fever,

chills, cough, dysuria, sore throat.

 

PHYSICAL EXAMINATION:

97.2, 155/74, 97% oxygen saturation.

General appearance:  He looks better than yesterday.  Color is better.  Less

uncomfortable.

HEENT:  Unremarkable.  Pharynx benign.

Lungs clear.

Heart regular rhythm.

Abdomen soft, diffusely mildly tender, a little better than yesterday.

No peripheral edema.

 

LABS:

White count 0.5 with 31% neutrophils for an absolute neutrophil count of 15,

hemoglobin 10.  Electrolytes unremarkable, creatinine 0.6.

 

IMPRESSION:

1.  Critical neutropenia, absolute neutrophil count of 15.  Continue Fortaz and

gentamicin per recommendation of Dr. Stahl.  I also gave him a dose of Neupogen

yesterday 480 mcg, I will repeat that today.  We are waiting for the oncology

consultation to be completed.

 

2.  Abdominal pain.  Hopefully this is just from constipation.  CT was

unrevealing.  Bowel care has been ordered.

 

3.  B cell lymphoma receiving active chemotherapy.  Checking daily phosphorous,

uric acid, CMP.

 

4.  History of depression.  Continue current regimen.

 

5.  Hypertension.  Blood pressure is well controlled.  I will reduce the rate of

his IV fluids.

## 2019-11-26 VITALS — DIASTOLIC BLOOD PRESSURE: 84 MMHG | SYSTOLIC BLOOD PRESSURE: 142 MMHG

## 2019-11-26 VITALS — DIASTOLIC BLOOD PRESSURE: 71 MMHG | SYSTOLIC BLOOD PRESSURE: 125 MMHG

## 2019-11-26 VITALS — DIASTOLIC BLOOD PRESSURE: 76 MMHG | SYSTOLIC BLOOD PRESSURE: 164 MMHG

## 2019-11-26 VITALS — SYSTOLIC BLOOD PRESSURE: 151 MMHG | DIASTOLIC BLOOD PRESSURE: 72 MMHG

## 2019-11-26 VITALS — SYSTOLIC BLOOD PRESSURE: 116 MMHG | DIASTOLIC BLOOD PRESSURE: 76 MMHG

## 2019-11-26 LAB
ALBUMIN SERPL BCG-MCNC: 2.2 GM/DL (ref 3.2–5.2)
ALT SERPL W P-5'-P-CCNC: 25 U/L (ref 12–78)
ANISOCYTOSIS BLD QL SMEAR: (no result)
BASOPHILS NFR BLD MANUAL: 2 % (ref 0–1)
BILIRUB SERPL-MCNC: 0.3 MG/DL (ref 0.2–1)
BUN SERPL-MCNC: 7 MG/DL (ref 7–18)
CALCIUM SERPL-MCNC: 8.1 MG/DL (ref 8.8–10.2)
CHLORIDE SERPL-SCNC: 107 MEQ/L (ref 98–107)
CO2 SERPL-SCNC: 31 MEQ/L (ref 21–32)
CREAT SERPL-MCNC: 0.6 MG/DL (ref 0.7–1.3)
EOSINOPHIL NFR BLD MANUAL: 4 % (ref 0–3)
GFR SERPL CREATININE-BSD FRML MDRD: > 60 ML/MIN/{1.73_M2} (ref 42–?)
GLUCOSE SERPL-MCNC: 75 MG/DL (ref 70–100)
HCT VFR BLD AUTO: 29.6 % (ref 42–52)
HGB BLD-MCNC: 9.4 G/DL (ref 13.5–17.5)
LYMPHOCYTES NFR BLD MANUAL: 25 % (ref 16–44)
MAGNESIUM SERPL-MCNC: 1.7 MG/DL (ref 1.8–2.4)
MCH RBC QN AUTO: 27.6 PG (ref 27–33)
MCHC RBC AUTO-ENTMCNC: 31.8 G/DL (ref 32–36.5)
MCV RBC AUTO: 87.1 FL (ref 80–96)
MONOCYTES NFR BLD MANUAL: 8 % (ref 0–5)
NEUTROPHILS NFR BLD MANUAL: 50 % (ref 28–66)
PLATELET # BLD AUTO: 125 10^3/UL (ref 150–450)
PLATELET BLD QL SMEAR: (no result)
POIKILOCYTOSIS BLD QL SMEAR: (no result)
POTASSIUM SERPL-SCNC: 3.5 MEQ/L (ref 3.5–5.1)
PROT SERPL-MCNC: 5.3 GM/DL (ref 6.4–8.2)
RBC # BLD AUTO: 3.4 10^6/UL (ref 4.3–6.1)
SODIUM SERPL-SCNC: 142 MEQ/L (ref 136–145)
URATE SERPL-MCNC: 3.4 MG/DL (ref 3.5–7.2)
VARIANT LYMPHS NFR BLD MANUAL: 7 % (ref 0–5)
WBC # BLD AUTO: 1.2 10^3/UL (ref 4–10)

## 2019-11-26 RX ADMIN — HYDROCODONE BITARTRATE AND ACETAMINOPHEN PRN TAB: 5; 325 TABLET ORAL at 22:01

## 2019-11-26 RX ADMIN — MAGNESIUM HYDROXIDE SCH ML: 400 SUSPENSION ORAL at 20:25

## 2019-11-26 RX ADMIN — ALLOPURINOL SCH MG: 300 TABLET ORAL at 09:14

## 2019-11-26 RX ADMIN — DEXTROSE MONOHYDRATE SCH MLS/HR: 5 INJECTION INTRAVENOUS at 02:46

## 2019-11-26 RX ADMIN — HYDROCODONE BITARTRATE AND ACETAMINOPHEN PRN TAB: 5; 325 TABLET ORAL at 12:45

## 2019-11-26 RX ADMIN — ASPIRIN SCH MG: 81 TABLET ORAL at 09:14

## 2019-11-26 RX ADMIN — DOCUSATE SODIUM,SENNOSIDES SCH TAB: 50; 8.6 TABLET, FILM COATED ORAL at 20:25

## 2019-11-26 RX ADMIN — DEXTROSE MONOHYDRATE SCH MLS/HR: 50 INJECTION, SOLUTION INTRAVENOUS at 07:07

## 2019-11-26 RX ADMIN — ONDANSETRON PRN MG: 4 TABLET, ORALLY DISINTEGRATING ORAL at 17:33

## 2019-11-26 RX ADMIN — PANTOPRAZOLE SODIUM SCH MG: 40 TABLET, DELAYED RELEASE ORAL at 09:14

## 2019-11-26 RX ADMIN — DEXTROSE MONOHYDRATE SCH MLS/HR: 50 INJECTION, SOLUTION INTRAVENOUS at 17:58

## 2019-11-26 RX ADMIN — ESCITALOPRAM OXALATE SCH MG: 10 TABLET, FILM COATED ORAL at 09:14

## 2019-11-26 RX ADMIN — DOCUSATE SODIUM,SENNOSIDES SCH TAB: 50; 8.6 TABLET, FILM COATED ORAL at 09:14

## 2019-11-26 RX ADMIN — FILGRASTIM SCH MCG: 480 INJECTION, SOLUTION INTRAVENOUS; SUBCUTANEOUS at 10:04

## 2019-11-26 RX ADMIN — POLYETHYLENE GLYCOL 3350 SCH PKT: 17 POWDER, FOR SOLUTION ORAL at 09:13

## 2019-11-26 RX ADMIN — MORPHINE SULFATE PRN MG: 4 INJECTION INTRAVENOUS at 22:43

## 2019-11-26 RX ADMIN — DIATRIZOATE MEGLUMINE AND DIATRIZOATE SODIUM SCH ML: 600; 100 SOLUTION ORAL; RECTAL at 18:48

## 2019-11-26 RX ADMIN — HYDROCODONE BITARTRATE AND ACETAMINOPHEN PRN TAB: 5; 325 TABLET ORAL at 17:22

## 2019-11-26 RX ADMIN — LISINOPRIL SCH MG: 40 TABLET ORAL at 09:14

## 2019-11-26 RX ADMIN — MORPHINE SULFATE PRN MG: 4 INJECTION INTRAVENOUS at 02:47

## 2019-11-26 RX ADMIN — HYDROCODONE BITARTRATE AND ACETAMINOPHEN PRN TAB: 5; 325 TABLET ORAL at 07:46

## 2019-11-26 RX ADMIN — DEXTROSE MONOHYDRATE SCH MLS/HR: 5 INJECTION INTRAVENOUS at 10:02

## 2019-11-26 RX ADMIN — ENOXAPARIN SODIUM SCH MG: 40 INJECTION SUBCUTANEOUS at 20:26

## 2019-11-26 RX ADMIN — DEXTROSE MONOHYDRATE SCH MLS/HR: 5 INJECTION INTRAVENOUS at 17:05

## 2019-11-26 NOTE — IPNPDOC
Text Note


Date of Service


The patient was seen on 11/26/19.





NOTE


Subjective:


-Feels ok this morning. Abdominal pain is well controlled "while here."


-Still has not had a BM


-Not complaining of significant back pain this morning


-Discussed the results of the MRI that was done yesterday with evidence of 

likely disease infiltration, and he articulated a desire to become DNR/DNI at 

this time and will discuss the way forward and if "this is the beginning of the 

end" with Dr. Garcia. We filled out the MOLST form.





10 point review of system was negative except for above





PHYSICAL EXAMINATION:


VITAL SIGNS: Please see below. Remains hemodynamically stable and afebrile


GENERAL: No distress, sitting up in chair, pleasant, cooperative


HEENT: Normocephalic, atraumatic, moist mucous membranes


NECK: Supple


CARDIOVASCULAR EXAMINATION: S1, S2, no murmurs


RESPIRATORY EXAMINATION: Clear to auscultation, no wheezing


ABDOMINAL EXAMINATION: Soft, mild right upper quadrant tenderness, nondistended,

hyperactive bowel sounds


EXTREMITIES: Trace bilateral lower extremity pitting edema


SKIN: No rash


NEUROLOGICAL EXAMINATION: Alert and oriented 3, no focal deficits 


PSYCHIATRIC EXAMINATION: Calm and cooperative





LABORATORY DATA: See below. Reviewed. Mag 1.7 repleted. K 3.5 repleted. 

Otherwise with improving neutropenia, WBC now 1.2, ANC still pending this AM and

stable anemia and Cr.





MICROBIOLOGY: Please see below. 





IMAGING: Reviewed. In the interim, had an MRI of his T spine:


1. Nonspecific T7, T2 signal hyperintensity, adjacent to the endplate with  

minimal enhancement, question osseous metastases versus Modic type I  

inflammatory endplate degenerative change. 


2. Slight T1 signal hypointensity within the marrow of the lower thoracic spine 

from T9-T12 suggesting a marrow replacing process. 


3. T2 hyperintense, enhancing area abutting the left T6 vertebral body  

measuring 2 cm, suspicious for a paraspinous metastases/lymph node. 


4. T11-T12 disc bulge and central disc protrusion causes mild spinal, and  

foraminal stenosis. 





ASSESSMENT: 69-year-old man with recently diagnosed DLBCL who recently underwent

cycle 1 of R-CHOP who presented with abdominal pain with associated constipation

and found to have profound neutropenia and placed on empiric ceftaz/gent with 

otherwise pan-negative infectious workup at this time, with course c/b 

persistent constipation with ongoing escalation of his bowel regimen.





PLAN:


1.  Critical neutropenia: improving WBC with neupogen, ANC pending from this 

morning.


- Continue empiric ceftaz and gent per recommendation of  oncology (Dr. Stahl).  


- s/p 2 doses of Neupogen 480 mcg, thus far, will continue until ANC is >1.5


- Was seen by Dr. Garcia, MRI c/f possible osseous mets with pain, will consult 

radiation oncology to review imaging and possible intervention


 


2.  Abdominal pain: Possibly due to constipation: 


- CT was unrevealing.  


- Continue senna/docusate BID, add milk of mag BID and dc miralax for now


- Morphine 4Q4PRN IV for severe pain, [id number] norco for moderate pain, [id number]

tylenol for mild to moderate pain for back pain as well with concern for 

metastatic disease


- Zofran 8Q6PRN PO for nausea





3. Diffuse large B-cell lymphoma:


 s/ p cycle 1 of R-CHOP 


- continue allopurinol and discontinue prednisone 80 daily per Dr. Garcia


- continue daily CMP, calcium, phosphorus, LDH and uric acid


- strict I/Os





4. GERD:


-continue protonix


 


5. History of depression:  


- continue home lexapro


 


6.  Hypertension:  Blood pressure is well controlled.  


-continue home lisinopril





7. GOC: decided to change his goals of care to DNR/DNI at this time. MOLST and 

code status order updated





DVT prophylaxis: Lovenox subcutaneous


Diet: Regular


Dispo: Transfer to med/surg





VS,Fishbone, I+O


VS, Fishbone, I+O


Laboratory Tests


11/26/19 04:19











Vital Signs








  Date Time  Temp Pulse Resp B/P (MAP) Pulse Ox O2 Delivery O2 Flow Rate FiO2


 


11/26/19 04:00 98.0 75 18 125/71 (89) 97 Room Air  














I&O- Last 24 Hours up to 6 AM 


 


 11/26/19





 05:59


 


Intake Total 1530 ml


 


Output Total 1725 ml


 


Balance -195 ml

















JULIO MANUEL MD   Nov 26, 2019 06:46

## 2019-11-26 NOTE — CR
MEDICAL ONCOLOGY INPATIENT CONSULTATION

 

DATE OF CONSULTATION: 11/25/2019

 

REQUESTING PHYSICIAN:  Octavio Perez MD

 

DIAGNOSIS:

Stage IV germinal center type diffuse large B-cell lymphoma with high burden of

liver and spleen involvement status post start of R-CHOP chemotherapy 11/14/2019.

Now admitted with grade 4 neutropenia, abdominal pain, constipation.

 

INTERVAL HISTORY:

Mr. Cronin began chemotherapy with R-CHOP on 11/14/2019 receiving chemotherapy

day 1, rituximab day 2, followed by 2 days of filgrastim 40 mcg.  He was intended

to have daily filgrastim to complete 10 days, this did not happen to my

knowledge.

 

Beginning  the day he was discharged, he says he developed new onset left upper

quadrant pain.  This progressed and finally he "could not take it any longer" and

came to the emergency room yesterday.  On admission, WBC was 0.2, absolute

neutrophil count 0, hemoglobin 10, hematocrit 32, platelets 110.  He was started

on filgrastim 40 mcg subcu daily.  Abdomen and pelvis CT without oral contrast

but with IV contrast revealed multiple enhancing masses throughout the liver, too

numerous to count, a small amount of ascites, and no significant change in the

appearance of bowel loops.  Notably, at the CT reading did not comment on spleen,

which on a 10/22/2019 was also the site of extensive hypermetabolic foci.

 

At the bedside, Mr. Cronin points to left upper quadrant and draws as hand across

the midline saying this is the site of his pain.  I am able to easily palpated

his abdomen, epigastrium, upper and lower quadrants without his wincing or

experiencing pain, but he says it comes on and off.  He has had a few bowel

movements in the last few days but says he has also eaten very little.

 

CURRENT MEDICATIONS:

- ondansetron p.r.n.

- hydrocodone / acetaminophen p.r.n.

- allopurinol 300 mg daily

- aspirin 81 mg daily

- citalopram 10 mg daily

- lisinopril 40 mg daily

- prednisone 80 mg daily

- polyethylene glycol p.r.n.

- Senna p.r.n.

- enoxaparin 40 mg subcu daily

- gentamicin q.12 h

- ceftazidime q. 8 h

- IV fluids

- morphine sulfate 4 mg q. 4 hours p.r.n.

- filgrastim 480 mcg subcu daily

- pantoprazole 40 mg daily

 

PAST MEDICAL HISTORY:

Hypertension, diffuse large B-cell lymphoma, as described above.

 

PAST SURGICAL HISTORY:

Negative.

 

SOCIAL HISTORY:

Never smoker.  Occasional alcohol.  , lives with his wife.  Active as a

.

 

FAMILY HISTORY:

Mother had lung cancer.

 

ALLERGIES:

No known drug allergies.

 

PHYSICAL EXAMINATION:

Limited physical examination.  Vital signs: Temperature 97, blood pressure

131/71, heart rate 64, respiratory 18, O2 sat 97%.

HEENT:  No scleral icterus.

Abdomen:  Nondistended, soft, no palpable mass.  No rebound tenderness.  Able to

palpate throughout the upper and lower abdomen.  No suprapubic tenderness.

Extremities:  1+ pitting edema bilaterally.

 

LABORATORY DATA:

WBC 0.5, , hemoglobin 10, hematocrit 31, platelets 129.  Lactic acid of

0.7 on 11/24/2019, uric acid 3.1, calcium 8.5, potassium 3.7, bicarbonate 29,

sodium 138, magnesium 1.8, total bilirubin 0.5, alkaline phosphatase 314 (down

from 465 and down from peak of 513), LDH not measured on this admission, most

recent 11/19/2019 was 637 down from peak of 2029 on 11/16/2019.

 

Abdomen and pelvis CT as noted.

 

IMPRESSION:

Stage IV germinal center diffuse large B-cell lymphoma, cytogenetic status

pending, diagnosed on liver biopsy 10/31/2019 with diffuse hepatic and splenic

involvement.  No other known sites.  The patient began on chemotherapy inpatient

11/14/2019 with fluid hydration and tumor lysis prophylaxis and continues on

allopurinol daily.

 

Grade 4 neutropenia secondary to chemotherapy and insufficient G-CSF treatment.

 

Abdominal pain of uncertain etiology.  The differential diagnosis including

splenic infarct secondary to burden of spleen, burden of disease and response to

chemotherapy, occult low thoracic spine involvement with tumor.  The patient

describes radiating pain centrally around left to the front from the back, left

greater than right at approximate T12 level versus, though less likely in the

current clinical picture, neutropenic colitis (typhlitis).

 

PLAN/RECOMMENDATIONS:

1.  Discontinue high-dose prednisone.  The patient needs to be on prednisone only

days 1 through 5 each cycle.  It is safe for him to discontinue this currently.

 

2.  Continue filgrastim 480 mcg subcu daily until absolute neutrophil count is

1500.

 

3.  I am recommending abdomen and pelvis CT be repeated at this time with p.o.

and IV contrast with close attention to explain looking for splenic infarct.

Also review the current films tomorrow morning with radiology.

 

4.  Low threshold for expanding antibiotic spectrum to cover for bowel shahzad /

anaerobes should the patient develop fever or hypotension.

 

5.  Consider thoracic MRI to rule out occult thoracic involvement with tumor.

 

Overall, splenic infarct is a very likely possible cause of the patient's pain

but more dire, underlying complications need to be ruled out.

 

We will follow.

## 2019-11-26 NOTE — REPVR
PROCEDURE INFORMATION: 

Exam: CT Abdomen And Pelvis With Contrast 

Exam date and time: 11/26/2019 7:16 PM 

Age: 69 years old 

Clinical history: Condition or disease; Cancer; Liver; Primary site; Additional 

info: Liver spleen nhl luq pain rule out spl infarct R/O bowel me 



TECHNIQUE: 

Imaging protocol: Computed tomography of the abdomen and pelvis with 

intravenous contrast. 

Radiation optimization: All CT scans at this facility use at least one of these 

dose optimization techniques: automated exposure control; mA and/or kV 

adjustment per patient size (includes targeted exams where dose is matched to 

clinical indication); or iterative reconstruction. 

Contrast material: ISOVUE 370; Contrast volume: 100 ml; Contrast route: IV;  

Other contrast: Route: Oral; 



COMPARISON: 

CT ABD/PEL W/IV CONTRAST ONLY 11/24/2019 11:18 AM 



FINDINGS: 

Pleural space: Tiny bilateral pleural effusions and minimal bilateral basilar 

atelectasis, unchanged. 



Liver: Diffuse low density masses scattered throughout the liver, consistent 

with metastatic disease, unchanged. No intrahepatic duct dilatation.

Gallbladder and bile ducts: Unremarkable. No calcified stones. No ductal 

dilation. 

Pancreas: Unremarkable. No ductal dilation. 

Spleen: 4 cm low-density mass within the inferior spleen, unchanged, consistent 

with splenic metastasis. No splenic infarct or hematoma identified. 

Adrenals: Normal. No mass. 

Kidneys and ureters: Left renal cyst, unchanged. Normal right kidney. No renal 

stone or hydronephrosis. 

Stomach and bowel: Unremarkable. No obstruction. No mucosal thickening. 

Appendix: No evidence of appendicitis. 

Intraperitoneal space: Small volume of abdominal and pelvic ascites, unchanged. 

Vasculature: Mild to moderate atherosclerosis of the abdominal aorta and branch 

vessels. No aneurysm. 

Lymph nodes: Unremarkable. No enlarged lymph nodes. 



Bladder: Unremarkable as visualized. 

Reproductive: Unremarkable as visualized. 

Bones/joints: No acute fracture. 

Soft tissues: Small right inguinal hernias containing fat, larger on the right. 

There is a small amount of ascites within the right inguinal hernia. 



IMPRESSION: 

1. Diffuse hepatic metastatic disease, unchanged. 

2. Splenic metastasis, unchanged. No splenic infarct or hematoma is identified. 

3. Small volume of abdominal and pelvic ascites, unchanged.



Electronically signed by: Will Tarango On 11/26/2019  19:38:45 PM

## 2019-11-27 VITALS — DIASTOLIC BLOOD PRESSURE: 74 MMHG | SYSTOLIC BLOOD PRESSURE: 139 MMHG

## 2019-11-27 VITALS — SYSTOLIC BLOOD PRESSURE: 138 MMHG | DIASTOLIC BLOOD PRESSURE: 82 MMHG

## 2019-11-27 VITALS — DIASTOLIC BLOOD PRESSURE: 69 MMHG | SYSTOLIC BLOOD PRESSURE: 130 MMHG

## 2019-11-27 LAB
ALBUMIN SERPL BCG-MCNC: 2.4 GM/DL (ref 3.2–5.2)
ALT SERPL W P-5'-P-CCNC: 30 U/L (ref 12–78)
ANISOCYTOSIS BLD QL SMEAR: (no result)
BASOPHILS # BLD AUTO: 0.1 10^3/UL (ref 0–0.2)
BASOPHILS NFR BLD AUTO: 1.1 % (ref 0–1)
BASOPHILS NFR BLD MANUAL: 1 % (ref 0–1)
BILIRUB SERPL-MCNC: 0.4 MG/DL (ref 0.2–1)
BUN SERPL-MCNC: 6 MG/DL (ref 7–18)
CALCIUM SERPL-MCNC: 8.5 MG/DL (ref 8.8–10.2)
CHLORIDE SERPL-SCNC: 105 MEQ/L (ref 98–107)
CO2 SERPL-SCNC: 33 MEQ/L (ref 21–32)
CREAT SERPL-MCNC: 0.69 MG/DL (ref 0.7–1.3)
EOSINOPHIL # BLD AUTO: 0 10^3/UL (ref 0–0.5)
EOSINOPHIL NFR BLD AUTO: 0.4 % (ref 0–3)
GFR SERPL CREATININE-BSD FRML MDRD: > 60 ML/MIN/{1.73_M2} (ref 42–?)
GLUCOSE SERPL-MCNC: 75 MG/DL (ref 70–100)
HCT VFR BLD AUTO: 29.7 % (ref 42–52)
HGB BLD-MCNC: 9.6 G/DL (ref 13.5–17.5)
LYMPHOCYTES # BLD AUTO: 0.5 10^3/UL (ref 1.5–5)
LYMPHOCYTES NFR BLD AUTO: 8.3 % (ref 24–44)
LYMPHOCYTES NFR BLD MANUAL: 9 % (ref 16–44)
MAGNESIUM SERPL-MCNC: 2.2 MG/DL (ref 1.8–2.4)
MCH RBC QN AUTO: 27.9 PG (ref 27–33)
MCHC RBC AUTO-ENTMCNC: 32.3 G/DL (ref 32–36.5)
MCV RBC AUTO: 86.3 FL (ref 80–96)
METAMYELOCYTES NFR BLD MANUAL: 3 % (ref 0–0)
MONOCYTES # BLD AUTO: 0.5 10^3/UL (ref 0–0.8)
MONOCYTES NFR BLD AUTO: 9.6 % (ref 0–5)
MONOCYTES NFR BLD MANUAL: 10 % (ref 0–5)
MYELOBLASTS NFR BLD MANUAL: 2 % (ref 0–0)
NEUTROPHILS # BLD AUTO: 3.8 10^3/UL (ref 1.5–8.5)
NEUTROPHILS NFR BLD AUTO: 69.5 % (ref 36–66)
NEUTROPHILS NFR BLD MANUAL: 69 % (ref 28–66)
PLATELET # BLD AUTO: 178 10^3/UL (ref 150–450)
PLATELET BLD QL SMEAR: NORMAL
POTASSIUM SERPL-SCNC: 3.8 MEQ/L (ref 3.5–5.1)
PROT SERPL-MCNC: 5.8 GM/DL (ref 6.4–8.2)
RBC # BLD AUTO: 3.44 10^6/UL (ref 4.3–6.1)
SODIUM SERPL-SCNC: 141 MEQ/L (ref 136–145)
TOXIC GRANULES BLD QL SMEAR: (no result)
URATE SERPL-MCNC: 3.8 MG/DL (ref 3.5–7.2)
VARIANT LYMPHS NFR BLD MANUAL: 5 % (ref 0–5)
WBC # BLD AUTO: 5.4 10^3/UL (ref 4–10)

## 2019-11-27 RX ADMIN — DEXTROSE MONOHYDRATE SCH MLS/HR: 5 INJECTION INTRAVENOUS at 09:58

## 2019-11-27 RX ADMIN — HYDROCODONE BITARTRATE AND ACETAMINOPHEN PRN TAB: 5; 325 TABLET ORAL at 19:37

## 2019-11-27 RX ADMIN — MAGNESIUM HYDROXIDE SCH ML: 400 SUSPENSION ORAL at 09:58

## 2019-11-27 RX ADMIN — PANTOPRAZOLE SODIUM SCH MG: 40 TABLET, DELAYED RELEASE ORAL at 09:59

## 2019-11-27 RX ADMIN — ONDANSETRON PRN MG: 4 TABLET, ORALLY DISINTEGRATING ORAL at 18:26

## 2019-11-27 RX ADMIN — HYDROCODONE BITARTRATE AND ACETAMINOPHEN PRN TAB: 5; 325 TABLET ORAL at 03:11

## 2019-11-27 RX ADMIN — DOCUSATE SODIUM,SENNOSIDES SCH TAB: 50; 8.6 TABLET, FILM COATED ORAL at 09:59

## 2019-11-27 RX ADMIN — DOCUSATE SODIUM,SENNOSIDES SCH TAB: 50; 8.6 TABLET, FILM COATED ORAL at 20:34

## 2019-11-27 RX ADMIN — ENOXAPARIN SODIUM SCH MG: 40 INJECTION SUBCUTANEOUS at 20:34

## 2019-11-27 RX ADMIN — HYDROCODONE BITARTRATE AND ACETAMINOPHEN PRN TAB: 5; 325 TABLET ORAL at 09:59

## 2019-11-27 RX ADMIN — DEXTROSE MONOHYDRATE SCH MLS/HR: 50 INJECTION, SOLUTION INTRAVENOUS at 05:46

## 2019-11-27 RX ADMIN — MAGNESIUM HYDROXIDE SCH ML: 400 SUSPENSION ORAL at 20:36

## 2019-11-27 RX ADMIN — ESCITALOPRAM OXALATE SCH MG: 10 TABLET, FILM COATED ORAL at 09:59

## 2019-11-27 RX ADMIN — LISINOPRIL SCH MG: 40 TABLET ORAL at 09:59

## 2019-11-27 RX ADMIN — ASPIRIN SCH MG: 81 TABLET ORAL at 09:59

## 2019-11-27 RX ADMIN — DEXTROSE MONOHYDRATE SCH MLS/HR: 5 INJECTION INTRAVENOUS at 02:31

## 2019-11-27 RX ADMIN — MORPHINE SULFATE PRN MG: 4 INJECTION INTRAVENOUS at 05:46

## 2019-11-27 RX ADMIN — HYDROCODONE BITARTRATE AND ACETAMINOPHEN PRN TAB: 5; 325 TABLET ORAL at 15:27

## 2019-11-27 RX ADMIN — ALLOPURINOL SCH MG: 300 TABLET ORAL at 09:59

## 2019-11-27 NOTE — IPNPDOC
Text Note


Date of Service


The patient was seen on 11/27/19.





NOTE


Subjective:


-Feels well this morning, eager to get back to living his life but is concerned 

that he feels well while here on IV meds that he will not be able to reproduce 

at home. We discussed a plan to switch all pain medications to oral medications 

today and determine how much oral pain meds will be sufficient at this time as 

he preps to go home tomorrow.


-Finally had 1 bowel movement last night, feels much better


-Not complaining of significant back pain this morning


-No subjective fever, chills, worsening abdominal pain, chest pain, shortness of

breath or dysuria.





Interim events:


-Saw. Dr. Garcia and radiation oncology. No intervention per radiation oncology, 

with tentative plan to go on to cycle 2 with R-CHOP with Dr. Garcia 





10 point review of system was otherwise negative except for above





PHYSICAL EXAMINATION:


VITAL SIGNS: Please see below. Remains hemodynamically stable and afebrile


GENERAL: No distress, pleasant, conversant


HEENT: Normocephalic, atraumatic, moist mucous membranes


NECK: Supple


CARDIOVASCULAR EXAMINATION: S1, S2, no murmurs


RESPIRATORY EXAMINATION: Clear to auscultation, no wheezing


ABDOMINAL EXAMINATION: Hyperactive bowel sounds, soft, mild right upper quadrant

tenderness, nondistended


EXTREMITIES: Trace bilateral lower extremity pitting edema


SKIN: No rash


NEUROLOGICAL EXAMINATION: Alert and oriented 3, no focal deficits 





LABORATORY DATA: See below. Reviewed. Resolved neutropenia. Stable anemia and 

Cr.





MICROBIOLOGY: Please see below. BCx negative to date. UCx was negative





IMAGING: Reviewed. 


1. Nonspecific T7, T2 signal hyperintensity, adjacent to the endplate with  

minimal enhancement, question osseous metastases versus Modic type I  

inflammatory endplate degenerative change. 


2. Slight T1 signal hypointensity within the marrow of the lower thoracic spine 

from T9-T12 suggesting a marrow replacing process. 


3. T2 hyperintense, enhancing area abutting the left T6 vertebral body  

measuring 2 cm, suspicious for a paraspinous metastases/lymph node. 


4. T11-T12 disc bulge and central disc protrusion causes mild spinal, and  

foraminal stenosis. 





ASSESSMENT: 69-year-old man with recently diagnosed germinal center DLBCL who 

recently underwent cycle 1 of R-CHOP who presented with abdominal pain with 

associated constipation and found to have profound neutropenia and placed on 

empiric ceftaz/gent with otherwise pan-negative infectious workup at this time, 

s/p neupogen with resolution of neutropenia and now discontinuing, with course 

c/b persistent constipation now resolved after bowel regimen escalation and d

iffuse pain with some evidence of osseous disease well controlled on current 

pain regimen.





PLAN:


1.  Critical neutropenia: resolved after 4 neupogen doses:


- Discontinue empiric ceftaz and gent per at this time after resolution of 

neutropenia, afebrile, hemodynamically stable and nonfocal exam 


- s/p 4 doses of Neupogen 480 mcg


-Saw. Dr. Garcia and radiation oncology. No intervention per radiation oncology, 

with tentative plan to go on to cycle 2 with R-CHOP with Dr. Garcia 


 


2.  Pain:


Abdominal pain: Possibly due to constipation: CT A/P was unrevealing.


Back pain: MRI showed evidence for osseous disease and radiation oncology was 

consulted but on review of imaging did not find intervenable lesions.


- Continue senna/docusate BID and milk of mag BID 


- Switching all meds to PO. Had minimally used the IV morphine 4Q4PRN IV so will

discontinue that now and continue the [id number] norco and [id number] tylenol for 

mild to moderate pain. MRI showed evidence for osseous disease and radiation 

oncology was consulted but on review of imaging did not find intervenable 

lesions.


- Zofran 8Q6PRN PO for nausea





3. Diffuse large B-cell lymphoma:


 s/ p cycle 1 of R-CHOP 


- continue allopurinol and discontinue prednisone 80 daily per Dr. Garcia


- continue daily CMP, calcium, phosphorus, LDH and uric acid


- strict I/Os





4. GERD:


-continue protonix


 


5. History of depression:  


- continue home lexapro


 


6.  Hypertension:  Blood pressure is well controlled.  


-continue home lisinopril





7. GOC: DNR/DNI at this time. MOLST and code status order updated





DVT prophylaxis: Lovenox subcutaneous


Diet: Regular


Dispo: Planning for home discharge early tomorrow AM





VS,Ada, I+O


VS, Fishbone, I+O


Laboratory Tests


11/27/19 05:56











Vital Signs








  Date Time  Temp Pulse Resp B/P (MAP) Pulse Ox O2 Delivery O2 Flow Rate FiO2


 


11/27/19 06:00 98.5 81 17 138/82 (100) 93 Room Air  














I&O- Last 24 Hours up to 6 AM 


 


 11/27/19





 06:00


 


Intake Total 1930 ml


 


Output Total 1950 ml


 


Balance -20 ml

















JULIO MANUEL MD   Nov 27, 2019 08:41

## 2019-11-27 NOTE — IPN
MEDICAL ONCOLOGY INPATIENT FOLLOWUP

 

DATE OF SERVICE:  11/26/2019

 

DIAGNOSIS:

Diffuse large B-cell lymphoma, molecular cytogenetics now day 12 cycle 1 R-CHOP

for stage IV B disease involving extensive hepatic and splenic lymphoma foci.

 

INTERVAL HISTORY:

Thoracic spine MRI reveals multifocal signal changes possibly consistent with

lymphoma involvement including hypointensity in the marrow of T9-T12 spine,

nonspecific T7 and T2 signal enhancement, T11-12 DJD type changes, and T6

enhancing area suspicious for paraspinal lymph node.  Of note, however no cord

compressing, no neural foraminal compressing lesions, no overt obvious spinal

lesions to explain the patient's upper abdominal pain.

 

Today, Mr. Cronin says his pain is being controlled but requires frequent dosing

of pain medication.  He points to his right upper quadrant today in contrast to

yesterday when he pointed to his left upper quadrant and clarifies the pain is

always in the right upper quadrant but radiates to the left  and sometimes 
around

to the back.  He has had no nausea or vomiting.  He has no shortness of breath 
or

chest pain.  He has been afebrile overnight.  Abdomen and pelvis CT is scheduled

for later in the evening.  We have asked radiation oncology to evaluate him for

potential palliative radiation but with the current findings on MR there may be

no appropriate target lesion.

 

I then spent time in speaking with Kal about his wishes in terms of 
treatment.

He is DO NOT RESUSCITATE/DO NOT INTUBATE currently.  However, he is adamant and

clear he wants to proceed with treatment if it can put him in partial or 
complete

remission and he is interested in continuing treatment.  He has approximately 10

more days to complete before the end of this cycle and the start of the second.

Chromosomal testing has been delayed due to issues at the lab.  I re-requested

FISH and cytogenetics, and these will be done.  Should he have a high risk

lymphoma such as high Ki-67 or Burkitt's type lymphoma, he may need rapid

evaluation at a quaternary center for inpatient lymphoma treatments.  For the

time being however, there is no need to consider a transfer and we await

molecular studies.  Pain management will be the top priority, and we will follow

him closely following discharge.

 

Kal was in agreement with all of the above.

 

IMPRESSION:

Stage IV diffuse large B-cell lymphoma, genetic alteration testing results

delayed.

Day 12 cycle 1 R-CHOP.

ECOG performance status 2-3 with improving pain control.

 

PLAN:

1.  If the patient's pain is adequately controlled and he is ambulatory, ready,

willing and able discharge home is appropriate with early followup next week in

the office for reassessment.

2.  Radiation oncology consult recommended to evaluate whether there are any

targetable sites for palliative radiation.

3.  Follow through with abdomen and pelvis CT with p.o. and IV contrast tonight

looking for potential organ infarct or other potential abdominopelvic causes of

the patient's right upper quadrant pain.  Unfortunately, I suspect tumor 
turnover

is the most likely cause of his right upper quadrant pain given the burden of

disease involving the liver.

 

11/27/2019 ADDENDUM 8:31 A.M.:

Abdomen and pelvis CT with p.o. and IV contrast is negative for evidence of

splenic infarct or hematoma.  There is a small volume of abdominopelvic ascites

unchanged and diffuse hepatic metastatic disease unchanged.  In reviewing the

11/24/2019 film, and comparing this to 09/26/2019 abdomen CTA, there are minor

differences but there may be partial response involving the liver and spleen 
with

more discrete masses involved in the liver now as opposed to a large 
conglomerate

area of mass.   Dr. Ashley has seen the patient, reviewed the MRI.  The

patient's pain syndrome does not correlate with MRI findings as the MRI findings

are not definitive for metastasis, so the paraspinal lymph node at T6 is highly

suggestive and likely there is marrow involvement of disease based on 
enhancement

signal.  For the time being Kal will continue on oral pain management and 
will

followup with him early on discharge.

QID

## 2019-11-28 VITALS — SYSTOLIC BLOOD PRESSURE: 140 MMHG | DIASTOLIC BLOOD PRESSURE: 74 MMHG

## 2019-11-28 LAB
ALBUMIN SERPL BCG-MCNC: 2.2 GM/DL (ref 3.2–5.2)
ALT SERPL W P-5'-P-CCNC: 21 U/L (ref 12–78)
ANISOCYTOSIS BLD QL SMEAR: (no result)
BASOPHILS NFR BLD MANUAL: 4 % (ref 0–1)
BILIRUB SERPL-MCNC: 0.3 MG/DL (ref 0.2–1)
BUN SERPL-MCNC: 6 MG/DL (ref 7–18)
CALCIUM SERPL-MCNC: 8.2 MG/DL (ref 8.8–10.2)
CHLORIDE SERPL-SCNC: 106 MEQ/L (ref 98–107)
CO2 SERPL-SCNC: 32 MEQ/L (ref 21–32)
CREAT SERPL-MCNC: 0.81 MG/DL (ref 0.7–1.3)
GFR SERPL CREATININE-BSD FRML MDRD: > 60 ML/MIN/{1.73_M2} (ref 42–?)
GLUCOSE SERPL-MCNC: 78 MG/DL (ref 70–100)
HCT VFR BLD AUTO: 30.7 % (ref 42–52)
HGB BLD-MCNC: 9.8 G/DL (ref 13.5–17.5)
LYMPHOCYTES NFR BLD MANUAL: 6 % (ref 16–44)
MAGNESIUM SERPL-MCNC: 2.2 MG/DL (ref 1.8–2.4)
MCH RBC QN AUTO: 27.8 PG (ref 27–33)
MCHC RBC AUTO-ENTMCNC: 31.9 G/DL (ref 32–36.5)
MCV RBC AUTO: 87 FL (ref 80–96)
METAMYELOCYTES NFR BLD MANUAL: 3 % (ref 0–0)
MONOCYTES NFR BLD MANUAL: 11 % (ref 0–5)
MYELOBLASTS NFR BLD MANUAL: 2 % (ref 0–0)
NEUTROPHILS NFR BLD MANUAL: 66 % (ref 28–66)
PLATELET # BLD AUTO: 218 10^3/UL (ref 150–450)
PLATELET BLD QL SMEAR: NORMAL
POTASSIUM SERPL-SCNC: 3.8 MEQ/L (ref 3.5–5.1)
PROMYELOCYTES # BLD MANUAL: 1 % (ref 0–0)
PROT SERPL-MCNC: 5.8 GM/DL (ref 6.4–8.2)
RBC # BLD AUTO: 3.53 10^6/UL (ref 4.3–6.1)
SODIUM SERPL-SCNC: 141 MEQ/L (ref 136–145)
URATE SERPL-MCNC: 3.9 MG/DL (ref 3.5–7.2)
VARIANT LYMPHS NFR BLD MANUAL: 1 % (ref 0–5)
WBC # BLD AUTO: 10.2 10^3/UL (ref 4–10)

## 2019-11-28 RX ADMIN — LISINOPRIL SCH MG: 40 TABLET ORAL at 08:27

## 2019-11-28 RX ADMIN — ALLOPURINOL SCH MG: 300 TABLET ORAL at 08:27

## 2019-11-28 RX ADMIN — MAGNESIUM HYDROXIDE SCH ML: 400 SUSPENSION ORAL at 08:28

## 2019-11-28 RX ADMIN — HYDROCODONE BITARTRATE AND ACETAMINOPHEN PRN TAB: 5; 325 TABLET ORAL at 00:54

## 2019-11-28 RX ADMIN — ONDANSETRON PRN MG: 4 TABLET, ORALLY DISINTEGRATING ORAL at 08:27

## 2019-11-28 RX ADMIN — HYDROCODONE BITARTRATE AND ACETAMINOPHEN PRN TAB: 5; 325 TABLET ORAL at 06:41

## 2019-11-28 RX ADMIN — ASPIRIN SCH MG: 81 TABLET ORAL at 08:27

## 2019-11-28 RX ADMIN — PANTOPRAZOLE SODIUM SCH MG: 40 TABLET, DELAYED RELEASE ORAL at 08:27

## 2019-11-28 RX ADMIN — DOCUSATE SODIUM,SENNOSIDES SCH TAB: 50; 8.6 TABLET, FILM COATED ORAL at 08:28

## 2019-11-28 RX ADMIN — ESCITALOPRAM OXALATE SCH MG: 10 TABLET, FILM COATED ORAL at 08:27

## 2019-11-28 NOTE — DS.PDOC
Discharge Summary


General


Date of Admission


2019 at 14:21


Date of Discharge


2019


Attending Physician:  JULIO MANUEL MD


Specialist/Consultants Involve:  Lilibeth Garcia MD


Specialist/Consultants Involve


Dr. Ashley (rad onc)





Discharge Summary


PROCEDURES PERFORMED DURING STAY: None





ADMITTING DIAGNOSES: 


1. Abdominal pain





DISCHARGE DIAGNOSES:


1. Diffuse large B-cell lymphoma


2. Constipation


3. Malignancy related pain, with evidence of osseous involvement





COMPLICATIONS/CHIEF COMPLAINT: NHL.





HISTORY OF PRESENT ILLNESS: 69-year-old man with recently diagnosed germinal 

center DLBCL who recently underwent cycle 1 of R-CHOP who presented with 

abdominal pain with associated constipation. 





HOSPITAL COURSE: In the ED, he was hemodynamically stable and afebrile and was 

found to have profound neutropenia and placed on empiric ceftaz/gent with 

otherwise pan-negative infectious workup and unrevealing CT A/P that showed 

known liver and splenic metastatic disease but no acute new findings. He 

received 4 doses of neupogen with recovery of his counts and at discharge ANC 

was 3.8 and Hgb was 9.8. His course was c/b back and abdominal pain for which 

Dr. Garcia, his oncologist recommended a C-spine MRI and a repeat abdominal CT 

A/P to r/o a splenic infarct vs. bowel ischemia. Thoracic spine MRI revealed 

multifocal signal changes possibly consistent with lymphoma involvement 

including hypointensity in the marrow of T9-T12 spine, nonspecific T7 and T2 

signal enhancement, T11-12 DJD type changes, and T6 enhancing suspicious for 

paraspinal lymph node without evidence of cord compression, neural foraminal 

compressing lesions, no overt obvious spinal lesions to explain the patient's 

upper abdominal pain. Dr. Garcia recommended a radiation oncology consult and on 

evaluation and looking at the imaging, Dr. Ashley did not identify an 

intervenable culprit lesions and so decision was made to work on adequate PO p

ain control with plan for continued treatment in the outpatient setting. The 

repeat CT A/P did not show any evidence of a splenic infarct or acute bowel 

pathology. His constipation resolved with escalation of a bowel regimen and will

now be discharged on senna/colace twice daily and PRN milk of magnesia or 

constipation, and 10 BID oxycodone ER as well as N1 tab of 5/325 

hydrocodone/acetaminophen every 4 hours as needed for pain. With regard to his 

wishes in terms of treatment, he expressly stated that he wanted to be DNR/DNI 

and after discussion with Dr. Garcia, decided that he would like to proceed with 

further treatment if it can put him in partial or complete remission. He is now 

being discharged home where he will resume his baseline activities without 

restrictions and unfortunately due to being discharged on Thanksgiving day, we 

were not able to set up his oncology, PCP and palliative care referral 

appointments. He will call on Monday to arrange PCP and oncology follow up, with

the plan that Dr. Garcia will refer him to palliative care if indicated.








DISCHARGE MEDICATIONS: Please see below.


 


ALLERGIES: Please see below.





PHYSICAL EXAMINATION ON DISCHARGE:


VITAL SIGNS: Please see below.


GENERAL: No distress, pleasant, conversant


HEENT: Normocephalic, atraumatic, moist mucous membranes


NECK: Supple


CARDIOVASCULAR EXAMINATION: S1, S2, no murmurs


RESPIRATORY EXAMINATION: Clear to auscultation, no wheezing


ABDOMINAL EXAMINATION: Normoactive bowel sounds, soft, mild left upper quadrant 

pain


EXTREMITIES: WWP, No lower extremity pitting edema


SKIN: No rash


NEUROLOGICAL EXAMINATION: Alert and oriented 3, no focal deficits 





LABORATORY DATA: See below. Reviewed. Resolved neutropenia. Stable anemia and 

Cr.





MICROBIOLOGY: Please see below. BCx negative to date. UCx was negative





IMAGIN/24: CXR: There is no acute cardiopulmonary disease.





: CT A/P: 


There is respiratory motion artifact obscuring the detail of the lung base 

images.  Mild dependent subsegmental atelectatic change is present with possible

tiny pleural effusions. These findings represent a change from the prior chest 

CT of 2019.


 


There are multiple mixed enhancing low density masses throughout the liver too 

numerous to count or individually assessed.  They vary in size from less than 1 

cm to coalescing greater than 11 cm.  Although technically different than the 

CTA abdomen 2019, these abnormalities were present on the prior exam.  

There is a small amount of ascites.  The gallbladder is within normal limits. 

There is a small amount of fluid in the lesser sac.  The pancreas is unchanged. 

The adrenal glands and kidneys are unchanged.  There is a left renal cyst, 

status quo.  There is no significant change in the appearance of the abdominal 

aorta or periaortic regions.  There was no significant change in the appearance 

of bowel loops.


 


CT PELVIS:


There is free pelvic fluid.  The bowel loops are within normal limits.  There is

no evidence of a pelvic mass or adenopathy.  There is a small amount of fluid 

trapped in the right inguinal canal.  This was not imaged on the prior exam.


Bone window technique throughout the exam again shows spinal, hip, sacroiliac 

joint degenerative changes.





: MRI T-spine


Vertebrae: Exaggerated thoracic kyphosis with maintained alignment and vertebral

body heights. T2 hyperintense, enhancing area abutting the left T6  vertebral candelaria

dy measuring 2 cm, suspicious for a paraspinous metastases/lymph  node. 


Marrow: Slight T1 signal hypointensity within the marrow of the lower thoracic  

spine from T9-T12 suggesting a marrow replacing process. 


Spinal cord: Normal signal. No cord compression. 


Discs/Spinal canal/Neural foramina: Nonspecific T7, T2 signal hyperintensity, 

adjacent to the endplate with minimal enhancement, question osseous metastases 

versus Modic type I inflammatory endplate degenerative change. C6-C7 disc  

bulging with mild stenosis. Mild degenerative disc and joint disease. T11-T12 

disc bulge and central disc protrusion causes mild spinal, and foraminal 

stenosis. 


Pleural space: Small pleural effusions. 


Soft tissues: Unremarkable. 





: CT A/P


1. Diffuse hepatic metastatic disease, unchanged. 


2. Splenic metastasis, unchanged. No splenic infarct or hematoma is identified. 


3. Small volume of abdominal and pelvic ascites, unchanged.





PROGNOSIS: Guarded, will depend on response to treatment





ACTIVITY: As tolerated





DIET: Regular





DISCHARGE PLAN: Home with onc and PCP follow up





DISPOSITION: Home





DISCHARGE INSTRUCTIONS:


1. Please take the oxycodone 10mg twice daily, and take a tablet of norco every 

4 hours as needed for pain. Please call your PCP and oncology to schedule follow

up appointments within the next 2 weeks.





ITEMS TO FOLLOWUP ON OUTPATIENT:


1. Malignancy related pain control


2. DLBCL





DISCHARGE CONDITION: Good





TIME SPENT ON DISCHARGE: 43 minutes.





Vital Signs/I&Os





Vital Signs








  Date Time  Temp Pulse Resp B/P (MAP) Pulse Ox O2 Delivery O2 Flow Rate FiO2


 


19 06:41   18   Room Air  


 


19 05:27 98.1 62  140/74 (96) 94   














I&O- Last 24 Hours up to 6 AM 


 


 19





 06:00


 


Intake Total 1790 ml


 


Output Total 2325 ml


 


Balance -535 ml











Laboratory Data


Labs 24H


Laboratory Tests 2


19 16:48: Gentamicin Level Trough 1.6


19 05:37: 


Immature Granulocyte % (Auto) , Nucleated Red Blood Cells % (auto) 0.3H, Ne

utrophils 66, Band Neutrophils 6, Lymphocytes (Manual) 6L, Monocytes (Manual) 

11H, Basophils (Manual) 4H, Metamyelocytes 3H, Myelocytes 2H, Promyelocytes 1H, 

Atypical Lymphocytes 1, Anisocytosis 1+, Platelet Estimate NORMAL, Anion Gap 3L,

Glomerular Filtration Rate > 60.0, Uric Acid 3.9, Calcium Level 8.2L, Magnesium 

Level 2.2, Total Bilirubin 0.3, Aspartate Amino Transf (AST/SGOT) 19, Alanine 

Aminotransferase (ALT/SGPT) 21, Alkaline Phosphatase 298H, Total Protein 5.8L, 

Albumin 2.2L, Albumin/Globulin Ratio 0.61L


CBC/BMP


Laboratory Tests


19 05:37











Microbiology





Microbiology


19 Blood Culture - Preliminary, Resulted


           No Growth after 72 hours. All specime...


19 Urine Culture - Final, Complete


           


19 Blood Culture - Preliminary, Resulted


           No Growth after 72 hours. All specime...





Discharge Medications


Scheduled


Allopurinol (Zyloprim) 300 Mg Tablet, 300 MG PO DAILY for tumor lysis 

prophylaxis


Aspirin (Ecotrin) 81 Mg Tablet.dr, 81 MG PO DAILY, (Reported)


Escitalopram Oxalate (Lexapro) 10 Mg Tablet, 10 MG PO DAILY, (Reported)


Lisinopril (Lisinopril) 40 Mg Tablet, 40 MG PO DAILY, (Reported)


Oxycodone HCl (Oxycodone HCl ER) 10 Mg Tab.er.12h, 5 MG PO BID


Pantoprazole Sodium (Pantoprazole Sodium) 40 Mg Tablet.dr, 40 MG PO DAILY


Sennosides/Docusate Sodium (Senna Plus Tablet) 1 Each Tablet, 1 TAB PO BID





Scheduled PRN


Acetaminophen (Acetaminophen) 325 Mg Tablet, 975 MG PO Q8H PRN for BACK PAIN


   ALTERNATING WITH IBUPROFEN 


Hydrocodone/Acetaminophen (Hydrocodone-Acetamin 5-325 mg) 1 Each Tablet, 1 TAB 

PO Q4HP PRN for MODERATE/SEVERE PAIN (PS 5-10)


Ibuprofen (Ibuprofen) 200 Mg Capsule, 600 MG PO Q8H PRN for BACK PAIN


   ALTERNATING WITH ACETAMINOPHEN 


Magnesium Hydroxide (Milk of Magnesia) 400 Mg/5 Ml Oral.susp, 30 ML PO BIDP PRN 

for CONSTIPATION


Ondansetron HCl (Ondansetron HCl) 8 Mg Tablet, 8 MG PO Q6H PRN for NAUSEA OR 

VOMITING





Allergies


Coded Allergies:  


     No Known Allergies (Unverified , 19)











JULIO MANUEL MD   2019 08:30

## 2019-12-01 NOTE — CR
RADIATION ONCOLOGY CONSULTATION NOTE

 

DATE OF CONSULTATION:  11/27/2019

 

CHART NUMBER:

.

 

DIAGNOSIS:

Diffuse large B-cell lymphoma.

 

STAGE:

IVB.

 

ECOG PERFORMANCE STATUS:

1.

 

CONSULTATION NOTE:

Mr. Cronin is a very pleasant 69-year-old white male with the diagnosis of what

appears to be a diffusely metastatic stage IVB diffuse large B-cell lymphoma with

involvement of the liver, spleen, and multiple lymph node sites who has just

undergone his first cycle of R-CHOP chemotherapy begun on 11/14/2019 and is now

hospitalized with neutropenia, abdominal pain, and constipation.  I am being

called to see whether or not there is any role for radiation of his thoracic

spine.

 

HISTORY OF PRESENT ILLNESS:

The patient was in his usual state of health and reported approximately a

10-pound weight loss over the last 6 months or so.  He began developing increased

onset of right upper quadrant abdominal pain which began in September of this

year.  He reports at times the pain had progressed to a level 8/10.  He also had

nausea.  A CT scan was undertaken, which showed hepatomegaly with multiple

hypoattenuated foci throughout the liver concerning for metastatic disease.

There was also noted to be splenomegaly with a 5.9 cm x 5.5 cm x 4.0 cm mass in

the inferior pole of this the spleen.  In addition, the patient reported

drenching night sweats and anorexia.

 

A PET scan was done on 10/22/2019, which showed gross abnormal hypermetabolic

activity seen in the liver and spleen with SUV values ranging from 5-17.  There

was no abnormal hypermetabolic activity in the neck or chest.

 

On 10/30/2019, the patient underwent a liver biopsy, and pathology revealed a

diffuse large B-cell lymphoma, germinal center immunophenotype.

 

The patient continued to have severe abdominal pain with a CT with contrast

showing his cirrhotic appearance of the liver, as well as hepatic steatosis,

hepatomegaly, and splenomegaly with moderate and multiple intrahepatic nodules.

He was admitted and underwent his first cycle of R-CHOP on 11/14/2019.  That was

day #1.  On day #2, he received rituximab followed by 2 days of filgrastim.  He

was intended to have filgrastim on a daily basis for 10 days, but apparently that

did not happen.

 

The patient was discharged and developed severe left upper and mid quadrant pain

in the abdomen, which became quite severe; and he re-presented to the emergency

room.  His white blood cell count was 0.2.  Absolute neutrophil count 0,

hemoglobin 10, hematocrit 30, platelet count 110.  He was restarted on filgrastim

40 mg subcu daily.

 

The CT scan of the abdomen and pelvis was done on 11/24/2019.  That showed free

pelvic fluid.  The bowel loops were within normal limits.  There was a small

amount of fluid trapped in a right inguinal canal.  This was not imaged on prior

studies.  Bony windows showed spinal, hip, and sacroiliac joint degenerative

changes, as well as tiny bilateral pleural effusions.

 

An MRI of the T-spine was done on 11/25/2019.  This showed an enhancing area

measuring 2 cm abutting the left T6 vertebral body, suspicious for a paraspinous

lymph node.  There were nonspecific T7 signal intensity changes in the endplate

with minimal enhancement.  This was thought to be degenerative in nature but

perhaps an osseous metastasis versus an MODIC type 1 inflammatory endplate

degenerative change.  There was a moderate degenerative disc and joint disease at

T11-T12 and disc bulges present.  This caused mild spinal and foraminal

stenosis.

 

I have personally reviewed the CT myself and then reviewed it with Dr. Padron,

our

on-site radiologist.  Dr. Padron did not see any definitive sites of malignancy

affecting the spinal cord nerve roots or metastatic sites of the vertebral

bodies.  He believed that that minimal enhancement at the endplate was more

likely degenerative in nature.  I agree with him, and I do not believe that this

patient's abdominal symptoms are caused from spinal column or vertebral body

metastasis.

 

Of note, the patient was on high-dosed prednisone apparently.  Then it was

discontinued.  Then it was reinitiated and discontinued again.  I am unsure if

any of his discomfort could be related to prednisone discontinuation issues.

 

PAST MEDICAL HISTORY:

The patient's past medical history is positive for liver disease, depression,

cardiac problems, hypertension, prediabetes, seborrheic dermatitis, degenerative

joint disease with chronic back pain and back issues, and macular degeneration.

 

ALLERGIES:

The patient has no known drug allergies.

 

SOCIAL HISTORY:

The patient has smoked one pack of cigarettes per day for 40 years.  He quit in

2009.  He does not abuse alcohol.

 

FAMILY HISTORY:

The patient's family history is positive for a mother with lung cancer.

 

REVIEW OF SYSTEMS:

The patient's review of systems is positive for some depression, as well as

anorexia and weight loss.  He reports weakness in his arms and legs and decreased

energy.  He reports that his abdominal pain is a 3/10 and much better this

morning.  He denies nausea, vomiting, fevers, chills, night sweats, diplopia,

headaches, chest pain, urinary or bowel difficulties, or neurological problems at

this time.

 

PHYSICAL EXAMINATION:

The patient was in his hospital bed eating breakfast.  Therefore, physical

examination was deferred at this point.

 

The patient has just initiated his systemic therapy with R-CHOP a matter of a few

days ago.  At this time, if the systemic therapy is to work, it should work just

as well on the 2 cm lymph node near the T6 vertebral level as it would on the

remainder of his disease.  Indeed, if the systemic therapy is not working, then

this patient is in serious trouble.

 

I do not believe the 2 cm lymph node near the level of T6 is the cause of his

significant and widespread abdominal pain.  Indeed, he has had this abdominal

pain for quite some time.  Review of his CAT scans and PET scans show a massive

amount of liver disease, as well as spleen issues.  The patient himself says he

has had issues with back pain most of his life, and this is quite different.

This feels abdominal and does not radiate from the back.  Indeed, the patient

denies back pain whatsoever.

 

I have personally reviewed, as noted above, this MRI myself and with Dr. Padron, our radiologist, and do not see any issues within the MRI that could be

contributory to his pain.  Indeed, the only believed site of metastatic disease

is at the level of T6, and his complaints do not follow that neurologic pattern.

 

I am unclear as to the history of high-dosed steroids and the sudden withdrawal

and then reinitiation and withdrawal again and the history.  It is my

understanding that this is not related to his discomfort, but again I find it

interesting that his pain developed after he was discharged from systemic therapy

and the prednisone was discontinued.

 

Perhaps we are dealing with some tumor ascites or some reaction of his disease to

therapy.  Indeed, the patient reports that his abdominal pain this morning is

much better.

 

Overall, I feel it is too early following treatment to make a decision as to

whether or not his R-CHOP therapy has been effective.  If it is an effective

treatment, it will treat all sites of disease, including this 2 cm nodule.  If it

is not effective, of course, radiation can be delivered to various problematic

sites, although I do not believe his abdominal discomfort comes from the

paraspinal T6 lymph node.

 

In light of his GI complaints, constipation, nausea, and other issues, I would

hesitate to radiate his abdomen, which would include radiating portions of his

liver, spleen, and intestines, as well as stomach.  We may, indeed, worsen his

situation significantly by radiating him at this point.

 

I believe he is undergoing the correct treatment, and I have confidence in his

medical oncologist, Dr. Lilibeth Garcia.  I will defer to her expertise.  I had a

personal discussion with her and let her know we are available to her at any time

if the situation changes.  At this point, I have not set him up for any specific

followup in our department.

 

Thank you once again for allowing us to participate in the care of this

gentleman.  If I could be of any assistance whatsoever, please feel free to

contact me at any time.

 

As always, warm regards.

 

Xavier Ashley

 

 

 

 

cc:    MD Octavio Baker MD

## 2019-12-19 ENCOUNTER — HOSPITAL ENCOUNTER (OUTPATIENT)
Dept: HOSPITAL 53 - M IRPRO | Age: 69
End: 2019-12-19
Attending: INTERNAL MEDICINE
Payer: MEDICARE

## 2019-12-19 VITALS — SYSTOLIC BLOOD PRESSURE: 145 MMHG | DIASTOLIC BLOOD PRESSURE: 76 MMHG

## 2019-12-19 DIAGNOSIS — C83.30: Primary | ICD-10-CM

## 2019-12-19 PROCEDURE — 36573 INSJ PICC RS&I 5 YR+: CPT

## 2019-12-20 NOTE — REP
PICC line insertion under ultrasound guidance.

 

The procedure was performed by ZAKI Miller, under the direct

supervision of Dr. Castillo.

 

The risks and benefits of the procedure were explained to the patient and

informed consent was obtained both verbally and written. Directly prior to the

start of the procedure, a formal timeout was completed in the procedure room.

 

The left basilic vein was localized using ultrasound guidance.  The skin was

prepped and draped in the sterile fashion.  3 ml 1% lidocaine 10 mg/ml was used

as a local anesthetic.  Using ultrasound guidance the left basilic vein was

cannulated and a 0.018 guidewire was inserted and advanced to the SVC using

fluoroscopic guidance.  The needle was removed and a 4.5 Malawian dilator and

peel-away sheath was inserted over the guidewire.  A 4.5 [place]ian single lumen

catheter was cut to the length of 40 cm.  The dilator was removed and the

catheter was inserted over the guide wire with the tip ending in the SVC.  The

peel-away sheath was removed and the catheter was flushed with heparinized saline

as per hospital protocol.  The catheter was affixed to the skin and a sterile

dressing was applied.

 

The patient tolerated the procedure well and there were no immediate

complications.

 

0.2 minutes of fluoroscopy  time was utilized for this procedure. Some

fluoroscopic images are performed with last image hold technology.  These images

require no additional radiation.

 

 

Reviewed by

ZAKI Rosenthal 12/19/2019 05:05 P

Electronically Signed by

Bernabe Castillo MD 12/20/2019 12:06 P

## 2019-12-21 ENCOUNTER — HOSPITAL ENCOUNTER (EMERGENCY)
Dept: HOSPITAL 53 - M ED | Age: 69
Discharge: HOME | End: 2019-12-21
Payer: MEDICARE

## 2019-12-21 VITALS
DIASTOLIC BLOOD PRESSURE: 77 MMHG | BODY MASS INDEX: 25.48 KG/M2 | SYSTOLIC BLOOD PRESSURE: 151 MMHG | HEIGHT: 67 IN | WEIGHT: 162.35 LBS

## 2019-12-21 DIAGNOSIS — Z87.891: ICD-10-CM

## 2019-12-21 DIAGNOSIS — Z45.2: Primary | ICD-10-CM

## 2019-12-21 DIAGNOSIS — Z79.899: ICD-10-CM

## 2019-12-22 ENCOUNTER — HOSPITAL ENCOUNTER (EMERGENCY)
Dept: HOSPITAL 53 - M ED | Age: 69
Discharge: HOME | End: 2019-12-22
Payer: MEDICARE

## 2019-12-22 VITALS — SYSTOLIC BLOOD PRESSURE: 136 MMHG | DIASTOLIC BLOOD PRESSURE: 70 MMHG

## 2019-12-22 VITALS — WEIGHT: 164.35 LBS | HEIGHT: 67 IN | BODY MASS INDEX: 25.8 KG/M2

## 2019-12-22 DIAGNOSIS — Z79.899: ICD-10-CM

## 2019-12-22 DIAGNOSIS — C22.9: ICD-10-CM

## 2019-12-22 DIAGNOSIS — Z79.82: ICD-10-CM

## 2019-12-22 DIAGNOSIS — Z45.2: Primary | ICD-10-CM

## 2020-01-06 ENCOUNTER — HOSPITAL ENCOUNTER (OUTPATIENT)
Dept: HOSPITAL 53 - M LAB REF | Age: 70
End: 2020-01-06
Attending: INTERNAL MEDICINE
Payer: MEDICARE

## 2020-01-06 DIAGNOSIS — C83.30: Primary | ICD-10-CM

## 2020-01-06 LAB
ALBUMIN SERPL BCG-MCNC: 2.9 GM/DL (ref 3.2–5.2)
ALT SERPL W P-5'-P-CCNC: 30 U/L (ref 12–78)
BASOPHILS # BLD AUTO: 0 10^3/UL (ref 0–0.2)
BASOPHILS NFR BLD AUTO: 0 % (ref 0–1)
BILIRUB SERPL-MCNC: 0.5 MG/DL (ref 0.2–1)
BUN SERPL-MCNC: 22 MG/DL (ref 7–18)
CALCIUM SERPL-MCNC: 8.3 MG/DL (ref 8.8–10.2)
CHLORIDE SERPL-SCNC: 109 MEQ/L (ref 98–107)
CO2 SERPL-SCNC: 28 MEQ/L (ref 21–32)
CREAT SERPL-MCNC: 0.7 MG/DL (ref 0.7–1.3)
EOSINOPHIL # BLD AUTO: 0.7 10^3/UL (ref 0–0.5)
EOSINOPHIL NFR BLD AUTO: 12.5 % (ref 0–3)
GFR SERPL CREATININE-BSD FRML MDRD: > 60 ML/MIN/{1.73_M2} (ref 42–?)
GLUCOSE SERPL-MCNC: 94 MG/DL (ref 70–100)
HCT VFR BLD AUTO: 28.9 % (ref 42–52)
HGB BLD-MCNC: 9.5 G/DL (ref 13.5–17.5)
LYMPHOCYTES # BLD AUTO: 0.2 10^3/UL (ref 1.5–5)
LYMPHOCYTES NFR BLD AUTO: 3.8 % (ref 24–44)
MCH RBC QN AUTO: 29.4 PG (ref 27–33)
MCHC RBC AUTO-ENTMCNC: 32.9 G/DL (ref 32–36.5)
MCV RBC AUTO: 89.5 FL (ref 80–96)
MONOCYTES # BLD AUTO: 0 10^3/UL (ref 0–0.8)
MONOCYTES NFR BLD AUTO: 0.8 % (ref 0–5)
NEUTROPHILS # BLD AUTO: 4.3 10^3/UL (ref 1.5–8.5)
NEUTROPHILS NFR BLD AUTO: 82.3 % (ref 36–66)
PLATELET # BLD AUTO: 116 10^3/UL (ref 150–450)
POTASSIUM SERPL-SCNC: 3.4 MEQ/L (ref 3.5–5.1)
PROT SERPL-MCNC: 5.6 GM/DL (ref 6.4–8.2)
RBC # BLD AUTO: 3.23 10^6/UL (ref 4.3–6.1)
SODIUM SERPL-SCNC: 143 MEQ/L (ref 136–145)
WBC # BLD AUTO: 5.2 10^3/UL (ref 4–10)

## 2020-01-09 ENCOUNTER — HOSPITAL ENCOUNTER (OUTPATIENT)
Dept: HOSPITAL 53 - M SHH | Age: 70
End: 2020-01-09
Attending: INTERNAL MEDICINE
Payer: MEDICARE

## 2020-01-09 ENCOUNTER — HOSPITAL ENCOUNTER (OUTPATIENT)
Dept: HOSPITAL 53 - M RAD | Age: 70
End: 2020-01-09
Attending: INTERNAL MEDICINE
Payer: MEDICARE

## 2020-01-09 DIAGNOSIS — K40.90: ICD-10-CM

## 2020-01-09 DIAGNOSIS — C83.37: ICD-10-CM

## 2020-01-09 DIAGNOSIS — R18.8: Primary | ICD-10-CM

## 2020-01-09 DIAGNOSIS — C83.30: Primary | ICD-10-CM

## 2020-01-09 DIAGNOSIS — R91.8: ICD-10-CM

## 2020-01-09 LAB
ALBUMIN SERPL BCG-MCNC: 2.8 GM/DL (ref 3.2–5.2)
ALT SERPL W P-5'-P-CCNC: 25 U/L (ref 12–78)
ANISOCYTOSIS BLD QL SMEAR: (no result)
BILIRUB SERPL-MCNC: 0.3 MG/DL (ref 0.2–1)
BUN SERPL-MCNC: 19 MG/DL (ref 7–18)
CALCIUM SERPL-MCNC: 7.9 MG/DL (ref 8.8–10.2)
CHLORIDE SERPL-SCNC: 108 MEQ/L (ref 98–107)
CO2 SERPL-SCNC: 26 MEQ/L (ref 21–32)
CREAT SERPL-MCNC: 0.6 MG/DL (ref 0.7–1.3)
EOSINOPHIL NFR BLD MANUAL: 18 % (ref 0–3)
GFR SERPL CREATININE-BSD FRML MDRD: > 60 ML/MIN/{1.73_M2} (ref 42–?)
GLUCOSE SERPL-MCNC: 94 MG/DL (ref 70–100)
HCT VFR BLD AUTO: 26.3 % (ref 42–52)
HGB BLD-MCNC: 8.5 G/DL (ref 13.5–17.5)
LYMPHOCYTES NFR BLD MANUAL: 3 % (ref 16–44)
MCH RBC QN AUTO: 30 PG (ref 27–33)
MCHC RBC AUTO-ENTMCNC: 32.3 G/DL (ref 32–36.5)
MCV RBC AUTO: 92.9 FL (ref 80–96)
MONOCYTES NFR BLD MANUAL: 1 % (ref 0–5)
NEUTROPHILS NFR BLD MANUAL: 78 % (ref 28–66)
PLATELET # BLD AUTO: 79 10^3/UL (ref 150–450)
PLATELET BLD QL SMEAR: (no result)
POTASSIUM SERPL-SCNC: 4 MEQ/L (ref 3.5–5.1)
PROT SERPL-MCNC: 5 GM/DL (ref 6.4–8.2)
RBC # BLD AUTO: 2.83 10^6/UL (ref 4.3–6.1)
SODIUM SERPL-SCNC: 142 MEQ/L (ref 136–145)
WBC # BLD AUTO: 8.7 10^3/UL (ref 4–10)

## 2020-01-09 PROCEDURE — 74177 CT ABD & PELVIS W/CONTRAST: CPT

## 2020-01-09 PROCEDURE — 71260 CT THORAX DX C+: CPT

## 2020-01-09 NOTE — REP
CT chest with IV contrast:

 

History:  Diffuse large B-cell lymphoma.  Splenomegaly.

 

Comparison chest CT study September 26, 2019.

 

CT contrast dose:  100 ml of intravenous Isovue 370 is administered.  A

right-sided [id number] catheter is noted in place.  There is also a left-sided

PICC line apparent.  There is vascular calcification including coronary artery

vascular calcification.  No hilar or mediastinal mass or adenopathy is observed.

There is no evidence of pleural or pericardial effusion.  There is however some

mild nodular pleural thickening on the right posteriorly and inferiorly which is

not apparent previously.  No pleural-based mass lesion is seen.  No pulmonary

nodule or mass lesion is seen.  No infiltrate is noted.  No endobronchial

abnormality is seen.  Multiple low-density liver lesions are seen.  There is no

evidence to suggest pulmonary embolus or acute aortic abnormality.  No

supraclavicular or axillary adenopathy is seen. No bony destructive lesion is

seen.

 

Impression:

 

Question mild nodular pleural thickening developing in the right posterior

pleural surface adjacent to the right lower lobe.  Otherwise no active

cardiopulmonary disease in the chest.  Multiple liver masses again noted.

 

 

Electronically Signed by

Bernabe Castillo MD 01/09/2020 02:31 P

## 2020-01-09 NOTE — REP
CT abdomen and pelvis with IV and oral contrast:

 

History:  Diffuse large B-cell lymphoma.

 

Comparison CT study November 26, 2019 and September 26, 2019.

 

CT contrast dose:  100 mL of intravenous Isovue 370.

 

CT findings:  There is improvement noted in the hepatic and splenic masses when

compared with the original CT study September 26, 2019.  The splenic lesion

measures 4.8 cm in greatest AP dimension today, 6.3 cm by my measurement on

September 26, 2019.  Right to left dimension has decreased from 5.2 cm previously

to 3.5 cm today in the splenic lesion.  No new splenic lesion is observed.  The

spleen remains enlarged.

 

Multiple low-density liver mass lesions persist although these are significantly

decreased from the September 26, 2019 study.  These lesions also shows some

improvement when compared with November 26, 2019.  This is less dramatic.  For

example, there is a lesion in the posterior segment right lobe measuring 4.0 cm

today, 5.2 cm on November 26 2019.  In September, this measured 6.2 cm.  The

confluent lesion spanning the right and left lobe of the liver more anteriorly

measures 8.2 cm in greatest dimension today.  It was 10.3 cm by my measurement on

November 26, 2019 at this level and 12.1 cm on September 26, 2019.  No new liver

lesion is appreciated.

 

There is mild perihepatic and perisplenic ascites.  No adrenal lesion is

observed.  No abnormalities noted in the pancreas or gallbladder.  The kidneys

enhance symmetrically.  There is a cyst in the lower pole on the left which is

unchanged.  There are scattered small normal-sized retroperitoneal nodes

unchanged.  There is a tiny amount of ascites in the paracolic gutters and pelvic

reflections.  This is unchanged.  There is a small right inguinal hernia which

transmits some fluid as well as abdominal fat.  Prostate, seminal vesicles and

urinary bladder are unremarkable.

 

Small and large bowel loops are unremarkable.

 

Impression:

 

Gradually decreasing multifocal hepatic and splenic masses.  Mild ascites is

again noted.  No new mass lesion is appreciated. Right inguinal hernia transmits

some abdominal fat and fluid.

 

 

Electronically Signed by

Bernabe Castillo MD 01/09/2020 06:31 P

## 2020-01-13 ENCOUNTER — HOSPITAL ENCOUNTER (OUTPATIENT)
Dept: HOSPITAL 53 - M SHH | Age: 70
End: 2020-01-13
Attending: INTERNAL MEDICINE
Payer: MEDICARE

## 2020-01-13 DIAGNOSIS — C83.30: Primary | ICD-10-CM

## 2020-01-13 LAB
ALBUMIN SERPL BCG-MCNC: 3.2 GM/DL (ref 3.2–5.2)
ALT SERPL W P-5'-P-CCNC: 24 U/L (ref 12–78)
ANISOCYTOSIS BLD QL SMEAR: (no result)
BASOPHILS NFR BLD MANUAL: 4 % (ref 0–1)
BILIRUB SERPL-MCNC: 0.2 MG/DL (ref 0.2–1)
BUN SERPL-MCNC: 12 MG/DL (ref 7–18)
CALCIUM SERPL-MCNC: 8.6 MG/DL (ref 8.8–10.2)
CHLORIDE SERPL-SCNC: 106 MEQ/L (ref 98–107)
CO2 SERPL-SCNC: 28 MEQ/L (ref 21–32)
CREAT SERPL-MCNC: 0.81 MG/DL (ref 0.7–1.3)
EOSINOPHIL NFR BLD MANUAL: 16 % (ref 0–3)
GFR SERPL CREATININE-BSD FRML MDRD: > 60 ML/MIN/{1.73_M2} (ref 42–?)
GLUCOSE SERPL-MCNC: 116 MG/DL (ref 70–100)
HCT VFR BLD AUTO: 28.5 % (ref 42–52)
HGB BLD-MCNC: 9.2 G/DL (ref 13.5–17.5)
LYMPHOCYTES NFR BLD MANUAL: 14 % (ref 16–44)
MCH RBC QN AUTO: 30.2 PG (ref 27–33)
MCHC RBC AUTO-ENTMCNC: 32.3 G/DL (ref 32–36.5)
MCV RBC AUTO: 93.4 FL (ref 80–96)
METAMYELOCYTES NFR BLD MANUAL: 2 % (ref 0–0)
MONOCYTES NFR BLD MANUAL: 15 % (ref 0–5)
MYELOBLASTS NFR BLD MANUAL: 4 % (ref 0–0)
NEUTROPHILS NFR BLD MANUAL: 41 % (ref 28–66)
PLATELET # BLD AUTO: 85 10^3/UL (ref 150–450)
PLATELET BLD QL SMEAR: (no result)
POTASSIUM SERPL-SCNC: 3.8 MEQ/L (ref 3.5–5.1)
PROMYELOCYTES # BLD MANUAL: 1 % (ref 0–0)
PROT SERPL-MCNC: 5.9 GM/DL (ref 6.4–8.2)
RBC # BLD AUTO: 3.05 10^6/UL (ref 4.3–6.1)
SODIUM SERPL-SCNC: 141 MEQ/L (ref 136–145)
WBC # BLD AUTO: 4.7 10^3/UL (ref 4–10)

## 2020-01-16 ENCOUNTER — HOSPITAL ENCOUNTER (OUTPATIENT)
Dept: HOSPITAL 53 - M SHH | Age: 70
End: 2020-01-16
Attending: INTERNAL MEDICINE
Payer: MEDICARE

## 2020-01-16 DIAGNOSIS — C83.30: Primary | ICD-10-CM

## 2020-01-16 LAB
ALBUMIN SERPL BCG-MCNC: 3.4 GM/DL (ref 3.2–5.2)
ALT SERPL W P-5'-P-CCNC: 25 U/L (ref 12–78)
BASOPHILS NFR BLD MANUAL: 1 % (ref 0–1)
BILIRUB SERPL-MCNC: 0.2 MG/DL (ref 0.2–1)
BUN SERPL-MCNC: 20 MG/DL (ref 7–18)
CALCIUM SERPL-MCNC: 8.9 MG/DL (ref 8.8–10.2)
CHLORIDE SERPL-SCNC: 106 MEQ/L (ref 98–107)
CO2 SERPL-SCNC: 27 MEQ/L (ref 21–32)
CREAT SERPL-MCNC: 0.8 MG/DL (ref 0.7–1.3)
DACRYOCYTES BLD QL SMEAR: (no result)
EOSINOPHIL NFR BLD MANUAL: 7 % (ref 0–3)
GFR SERPL CREATININE-BSD FRML MDRD: > 60 ML/MIN/{1.73_M2} (ref 42–?)
GLUCOSE SERPL-MCNC: 107 MG/DL (ref 70–100)
HCT VFR BLD AUTO: 29.8 % (ref 42–52)
HGB BLD-MCNC: 9.7 G/DL (ref 13.5–17.5)
LYMPHOCYTES NFR BLD MANUAL: 8 % (ref 16–44)
MCH RBC QN AUTO: 30.4 PG (ref 27–33)
MCHC RBC AUTO-ENTMCNC: 32.6 G/DL (ref 32–36.5)
MCV RBC AUTO: 93.4 FL (ref 80–96)
MICROCYTES BLD QL SMEAR: (no result)
MONOCYTES NFR BLD MANUAL: 7 % (ref 0–5)
MYELOBLASTS NFR BLD MANUAL: 3 % (ref 0–0)
NEUTROPHILS NFR BLD MANUAL: 70 % (ref 28–66)
OVALOCYTES BLD QL SMEAR: (no result)
PLATELET # BLD AUTO: 97 10^3/UL (ref 150–450)
PLATELET BLD QL SMEAR: (no result)
POTASSIUM SERPL-SCNC: 3.7 MEQ/L (ref 3.5–5.1)
PROT SERPL-MCNC: 6.1 GM/DL (ref 6.4–8.2)
RBC # BLD AUTO: 3.19 10^6/UL (ref 4.3–6.1)
SODIUM SERPL-SCNC: 141 MEQ/L (ref 136–145)
VARIANT LYMPHS NFR BLD MANUAL: 1 % (ref 0–5)
WBC # BLD AUTO: 9.4 10^3/UL (ref 4–10)

## 2020-01-27 ENCOUNTER — HOSPITAL ENCOUNTER (OUTPATIENT)
Dept: HOSPITAL 53 - M SHH | Age: 70
End: 2020-01-27
Attending: INTERNAL MEDICINE
Payer: MEDICARE

## 2020-01-27 DIAGNOSIS — C83.30: Primary | ICD-10-CM

## 2020-01-27 LAB
ALBUMIN SERPL BCG-MCNC: 3 GM/DL (ref 3.2–5.2)
ALT SERPL W P-5'-P-CCNC: 56 U/L (ref 12–78)
ANISOCYTOSIS BLD QL SMEAR: (no result)
BILIRUB SERPL-MCNC: 0.6 MG/DL (ref 0.2–1)
BUN SERPL-MCNC: 20 MG/DL (ref 7–18)
CALCIUM SERPL-MCNC: 8.2 MG/DL (ref 8.8–10.2)
CHLORIDE SERPL-SCNC: 105 MEQ/L (ref 98–107)
CO2 SERPL-SCNC: 31 MEQ/L (ref 21–32)
CREAT SERPL-MCNC: 0.72 MG/DL (ref 0.7–1.3)
DACRYOCYTES BLD QL SMEAR: (no result)
EOSINOPHIL NFR BLD MANUAL: 1 % (ref 0–3)
GFR SERPL CREATININE-BSD FRML MDRD: > 60 ML/MIN/{1.73_M2} (ref 42–?)
GLUCOSE SERPL-MCNC: 117 MG/DL (ref 70–100)
HCT VFR BLD AUTO: 30.1 % (ref 42–52)
HGB BLD-MCNC: 9.6 G/DL (ref 13.5–17.5)
HYPOCHROMIA BLD QL SMEAR: (no result)
LYMPHOCYTES NFR BLD MANUAL: 5 % (ref 16–44)
MACROCYTES BLD QL SMEAR: (no result)
MCH RBC QN AUTO: 30.2 PG (ref 27–33)
MCHC RBC AUTO-ENTMCNC: 31.9 G/DL (ref 32–36.5)
MCV RBC AUTO: 94.7 FL (ref 80–96)
MONOCYTES NFR BLD MANUAL: 2 % (ref 0–5)
NEUTROPHILS NFR BLD MANUAL: 91 % (ref 28–66)
PLATELET # BLD AUTO: 137 10^3/UL (ref 150–450)
PLATELET BLD QL SMEAR: NORMAL
POTASSIUM SERPL-SCNC: 3.1 MEQ/L (ref 3.5–5.1)
PROT SERPL-MCNC: 5.3 GM/DL (ref 6.4–8.2)
RBC # BLD AUTO: 3.18 10^6/UL (ref 4.3–6.1)
SODIUM SERPL-SCNC: 141 MEQ/L (ref 136–145)
WBC # BLD AUTO: 4.4 10^3/UL (ref 4–10)

## 2020-01-30 ENCOUNTER — HOSPITAL ENCOUNTER (OUTPATIENT)
Dept: HOSPITAL 53 - M SHH | Age: 70
End: 2020-01-30
Attending: INTERNAL MEDICINE
Payer: MEDICARE

## 2020-01-30 DIAGNOSIS — C83.30: Primary | ICD-10-CM

## 2020-01-30 LAB
ALBUMIN SERPL BCG-MCNC: 3.1 GM/DL (ref 3.2–5.2)
ALT SERPL W P-5'-P-CCNC: 31 U/L (ref 12–78)
BILIRUB SERPL-MCNC: 0.4 MG/DL (ref 0.2–1)
BUN SERPL-MCNC: 20 MG/DL (ref 7–18)
CALCIUM SERPL-MCNC: 8.2 MG/DL (ref 8.8–10.2)
CHLORIDE SERPL-SCNC: 105 MEQ/L (ref 98–107)
CO2 SERPL-SCNC: 31 MEQ/L (ref 21–32)
CREAT SERPL-MCNC: 0.64 MG/DL (ref 0.7–1.3)
GFR SERPL CREATININE-BSD FRML MDRD: > 60 ML/MIN/{1.73_M2} (ref 42–?)
GLUCOSE SERPL-MCNC: 104 MG/DL (ref 70–100)
HCT VFR BLD AUTO: 25.8 % (ref 42–52)
HGB BLD-MCNC: 8.3 G/DL (ref 13.5–17.5)
MCH RBC QN AUTO: 31.1 PG (ref 27–33)
MCHC RBC AUTO-ENTMCNC: 32.2 G/DL (ref 32–36.5)
MCV RBC AUTO: 96.6 FL (ref 80–96)
PLATELET # BLD AUTO: 103 10^3/UL (ref 150–450)
POTASSIUM SERPL-SCNC: 4.1 MEQ/L (ref 3.5–5.1)
PROT SERPL-MCNC: 5.4 GM/DL (ref 6.4–8.2)
RBC # BLD AUTO: 2.67 10^6/UL (ref 4.3–6.1)
SODIUM SERPL-SCNC: 140 MEQ/L (ref 136–145)
WBC # BLD AUTO: 6.1 10^3/UL (ref 4–10)

## 2020-02-03 ENCOUNTER — HOSPITAL ENCOUNTER (OUTPATIENT)
Dept: HOSPITAL 53 - M SHH | Age: 70
End: 2020-02-03
Attending: INTERNAL MEDICINE
Payer: MEDICARE

## 2020-02-03 DIAGNOSIS — C83.30: Primary | ICD-10-CM

## 2020-02-03 LAB
ALBUMIN SERPL BCG-MCNC: 3.2 GM/DL (ref 3.2–5.2)
ALT SERPL W P-5'-P-CCNC: 24 U/L (ref 12–78)
BASO STIPL BLD QL SMEAR: (no result)
BASOPHILS NFR BLD MANUAL: 2 % (ref 0–1)
BILIRUB SERPL-MCNC: 0.3 MG/DL (ref 0.2–1)
BUN SERPL-MCNC: 11 MG/DL (ref 7–18)
CALCIUM SERPL-MCNC: 8.5 MG/DL (ref 8.8–10.2)
CHLORIDE SERPL-SCNC: 105 MEQ/L (ref 98–107)
CO2 SERPL-SCNC: 28 MEQ/L (ref 21–32)
CREAT SERPL-MCNC: 0.91 MG/DL (ref 0.7–1.3)
DACRYOCYTES BLD QL SMEAR: (no result)
EOSINOPHIL NFR BLD MANUAL: 7 % (ref 0–3)
GFR SERPL CREATININE-BSD FRML MDRD: > 60 ML/MIN/{1.73_M2} (ref 42–?)
GLUCOSE SERPL-MCNC: 98 MG/DL (ref 70–100)
HCT VFR BLD AUTO: 26.3 % (ref 42–52)
HGB BLD-MCNC: 8 G/DL (ref 13.5–17.5)
LYMPHOCYTES NFR BLD MANUAL: 14 % (ref 16–44)
MACROCYTES BLD QL SMEAR: (no result)
MCH RBC QN AUTO: 30.1 PG (ref 27–33)
MCHC RBC AUTO-ENTMCNC: 30.4 G/DL (ref 32–36.5)
MCV RBC AUTO: 98.9 FL (ref 80–96)
METAMYELOCYTES NFR BLD MANUAL: 4 % (ref 0–0)
MICROCYTES BLD QL SMEAR: (no result)
MONOCYTES NFR BLD MANUAL: 14 % (ref 0–5)
MYELOBLASTS NFR BLD MANUAL: 8 % (ref 0–0)
NEUTROPHILS NFR BLD MANUAL: 39 % (ref 28–66)
PLATELET # BLD AUTO: 83 10^3/UL (ref 150–450)
PLATELET BLD QL SMEAR: (no result)
POTASSIUM SERPL-SCNC: 3.6 MEQ/L (ref 3.5–5.1)
PROT SERPL-MCNC: 5.6 GM/DL (ref 6.4–8.2)
RBC # BLD AUTO: 2.66 10^6/UL (ref 4.3–6.1)
SODIUM SERPL-SCNC: 141 MEQ/L (ref 136–145)
VARIANT LYMPHS NFR BLD MANUAL: 6 % (ref 0–5)
WBC # BLD AUTO: 4 10^3/UL (ref 4–10)

## 2020-02-04 ENCOUNTER — HOSPITAL ENCOUNTER (OUTPATIENT)
Dept: HOSPITAL 53 - M INFU | Age: 70
Discharge: HOME | End: 2020-02-04
Attending: NURSE PRACTITIONER
Payer: MEDICARE

## 2020-02-04 VITALS — SYSTOLIC BLOOD PRESSURE: 112 MMHG | DIASTOLIC BLOOD PRESSURE: 56 MMHG

## 2020-02-04 VITALS — HEIGHT: 67 IN | WEIGHT: 175.71 LBS | BODY MASS INDEX: 27.58 KG/M2

## 2020-02-04 DIAGNOSIS — T82.898A: Primary | ICD-10-CM

## 2020-02-04 PROCEDURE — 36592 COLLECT BLOOD FROM PICC: CPT

## 2020-02-06 ENCOUNTER — HOSPITAL ENCOUNTER (OUTPATIENT)
Dept: HOSPITAL 53 - M SHH | Age: 70
End: 2020-02-06
Attending: INTERNAL MEDICINE
Payer: MEDICARE

## 2020-02-06 DIAGNOSIS — C83.30: Primary | ICD-10-CM

## 2020-02-06 LAB
ALBUMIN SERPL BCG-MCNC: 3.4 GM/DL (ref 3.2–5.2)
ALT SERPL W P-5'-P-CCNC: 29 U/L (ref 12–78)
BILIRUB SERPL-MCNC: 0.5 MG/DL (ref 0.2–1)
BUN SERPL-MCNC: 10 MG/DL (ref 7–18)
CALCIUM SERPL-MCNC: 8.4 MG/DL (ref 8.8–10.2)
CHLORIDE SERPL-SCNC: 108 MEQ/L (ref 98–107)
CO2 SERPL-SCNC: 30 MEQ/L (ref 21–32)
CREAT SERPL-MCNC: 0.92 MG/DL (ref 0.7–1.3)
DACRYOCYTES BLD QL SMEAR: (no result)
EOSINOPHIL NFR BLD MANUAL: 2 % (ref 0–3)
GFR SERPL CREATININE-BSD FRML MDRD: > 60 ML/MIN/{1.73_M2} (ref 42–?)
GLUCOSE SERPL-MCNC: 118 MG/DL (ref 70–100)
HCT VFR BLD AUTO: 27.3 % (ref 42–52)
HGB BLD-MCNC: 8.3 G/DL (ref 13.5–17.5)
LYMPHOCYTES NFR BLD MANUAL: 1 % (ref 16–44)
MACROCYTES BLD QL SMEAR: (no result)
MCH RBC QN AUTO: 30.1 PG (ref 27–33)
MCHC RBC AUTO-ENTMCNC: 30.4 G/DL (ref 32–36.5)
MCV RBC AUTO: 98.9 FL (ref 80–96)
METAMYELOCYTES NFR BLD MANUAL: 5 % (ref 0–0)
MICROCYTES BLD QL SMEAR: (no result)
MONOCYTES NFR BLD MANUAL: 11 % (ref 0–5)
MYELOBLASTS NFR BLD MANUAL: 5 % (ref 0–0)
NEUTROPHILS NFR BLD MANUAL: 63 % (ref 28–66)
PLATELET # BLD AUTO: 137 10^3/UL (ref 150–450)
PLATELET BLD QL SMEAR: (no result)
POLYCHROMASIA BLD QL SMEAR: (no result)
POTASSIUM SERPL-SCNC: 4.2 MEQ/L (ref 3.5–5.1)
PROT SERPL-MCNC: 6 GM/DL (ref 6.4–8.2)
RBC # BLD AUTO: 2.76 10^6/UL (ref 4.3–6.1)
SODIUM SERPL-SCNC: 142 MEQ/L (ref 136–145)
VARIANT LYMPHS NFR BLD MANUAL: 3 % (ref 0–5)
WBC # BLD AUTO: 16.1 10^3/UL (ref 4–10)

## 2020-02-17 ENCOUNTER — HOSPITAL ENCOUNTER (OUTPATIENT)
Dept: HOSPITAL 53 - M LAB REF | Age: 70
End: 2020-02-17
Attending: INTERNAL MEDICINE
Payer: MEDICARE

## 2020-02-17 DIAGNOSIS — C83.30: Primary | ICD-10-CM

## 2020-02-17 LAB
ALBUMIN SERPL BCG-MCNC: 2.9 GM/DL (ref 3.2–5.2)
ALT SERPL W P-5'-P-CCNC: 54 U/L (ref 12–78)
BASOPHILS NFR BLD MANUAL: 1 % (ref 0–1)
BILIRUB SERPL-MCNC: 0.4 MG/DL (ref 0.2–1)
BUN SERPL-MCNC: 24 MG/DL (ref 7–18)
CALCIUM SERPL-MCNC: 8 MG/DL (ref 8.8–10.2)
CHLORIDE SERPL-SCNC: 106 MEQ/L (ref 98–107)
CO2 SERPL-SCNC: 30 MEQ/L (ref 21–32)
CREAT SERPL-MCNC: 0.67 MG/DL (ref 0.7–1.3)
DACRYOCYTES BLD QL SMEAR: (no result)
EOSINOPHIL NFR BLD MANUAL: 1 % (ref 0–3)
GFR SERPL CREATININE-BSD FRML MDRD: > 60 ML/MIN/{1.73_M2} (ref 42–?)
GLUCOSE SERPL-MCNC: 107 MG/DL (ref 70–100)
HCT VFR BLD AUTO: 23.8 % (ref 42–52)
HGB BLD-MCNC: 7.5 G/DL (ref 13.5–17.5)
LYMPHOCYTES NFR BLD MANUAL: 4 % (ref 16–44)
MCH RBC QN AUTO: 30.6 PG (ref 27–33)
MCHC RBC AUTO-ENTMCNC: 31.5 G/DL (ref 32–36.5)
MCV RBC AUTO: 97.1 FL (ref 80–96)
METAMYELOCYTES NFR BLD MANUAL: 1 % (ref 0–0)
MYELOBLASTS NFR BLD MANUAL: 1 % (ref 0–0)
NEUTROPHILS NFR BLD MANUAL: 92 % (ref 28–66)
OVALOCYTES BLD QL SMEAR: (no result)
PLATELET # BLD AUTO: 145 10^3/UL (ref 150–450)
PLATELET BLD QL SMEAR: NORMAL
POTASSIUM SERPL-SCNC: 3.4 MEQ/L (ref 3.5–5.1)
PROT SERPL-MCNC: 4.9 GM/DL (ref 6.4–8.2)
RBC # BLD AUTO: 2.45 10^6/UL (ref 4.3–6.1)
SODIUM SERPL-SCNC: 142 MEQ/L (ref 136–145)
WBC # BLD AUTO: 4.7 10^3/UL (ref 4–10)

## 2020-02-20 ENCOUNTER — HOSPITAL ENCOUNTER (OUTPATIENT)
Dept: HOSPITAL 53 - M INFU | Age: 70
Discharge: HOME | End: 2020-02-20
Attending: INTERNAL MEDICINE
Payer: MEDICARE

## 2020-02-20 ENCOUNTER — HOSPITAL ENCOUNTER (OUTPATIENT)
Dept: HOSPITAL 53 - M SHH | Age: 70
End: 2020-02-20
Attending: INTERNAL MEDICINE
Payer: MEDICARE

## 2020-02-20 VITALS — BODY MASS INDEX: 29.81 KG/M2 | WEIGHT: 174.61 LBS | HEIGHT: 64 IN

## 2020-02-20 VITALS — DIASTOLIC BLOOD PRESSURE: 53 MMHG | SYSTOLIC BLOOD PRESSURE: 104 MMHG

## 2020-02-20 VITALS — SYSTOLIC BLOOD PRESSURE: 106 MMHG | DIASTOLIC BLOOD PRESSURE: 64 MMHG

## 2020-02-20 VITALS — SYSTOLIC BLOOD PRESSURE: 140 MMHG | DIASTOLIC BLOOD PRESSURE: 66 MMHG

## 2020-02-20 VITALS — DIASTOLIC BLOOD PRESSURE: 54 MMHG | SYSTOLIC BLOOD PRESSURE: 102 MMHG

## 2020-02-20 VITALS — DIASTOLIC BLOOD PRESSURE: 52 MMHG | SYSTOLIC BLOOD PRESSURE: 106 MMHG

## 2020-02-20 VITALS — SYSTOLIC BLOOD PRESSURE: 125 MMHG | DIASTOLIC BLOOD PRESSURE: 63 MMHG

## 2020-02-20 VITALS — DIASTOLIC BLOOD PRESSURE: 82 MMHG | SYSTOLIC BLOOD PRESSURE: 122 MMHG

## 2020-02-20 DIAGNOSIS — C83.30: ICD-10-CM

## 2020-02-20 DIAGNOSIS — D64.9: Primary | ICD-10-CM

## 2020-02-20 DIAGNOSIS — C83.30: Primary | ICD-10-CM

## 2020-02-20 LAB
ALBUMIN SERPL BCG-MCNC: 3 GM/DL (ref 3.2–5.2)
ALT SERPL W P-5'-P-CCNC: 34 U/L (ref 12–78)
BILIRUB SERPL-MCNC: 0.5 MG/DL (ref 0.2–1)
BUN SERPL-MCNC: 17 MG/DL (ref 7–18)
CALCIUM SERPL-MCNC: 8.2 MG/DL (ref 8.8–10.2)
CHLORIDE SERPL-SCNC: 107 MEQ/L (ref 98–107)
CO2 SERPL-SCNC: 30 MEQ/L (ref 21–32)
CREAT SERPL-MCNC: 0.6 MG/DL (ref 0.7–1.3)
DACRYOCYTES BLD QL SMEAR: (no result)
GFR SERPL CREATININE-BSD FRML MDRD: > 60 ML/MIN/{1.73_M2} (ref 42–?)
GLUCOSE SERPL-MCNC: 99 MG/DL (ref 70–100)
HCT VFR BLD AUTO: 20.1 % (ref 42–52)
HGB BLD-MCNC: 6.3 G/DL (ref 13.5–17.5)
HYPOCHROMIA BLD QL SMEAR: (no result)
MACROCYTES BLD QL SMEAR: (no result)
MCH RBC QN AUTO: 31 PG (ref 27–33)
MCHC RBC AUTO-ENTMCNC: 31.3 G/DL (ref 32–36.5)
MCV RBC AUTO: 99 FL (ref 80–96)
MONOCYTES NFR BLD MANUAL: 1 % (ref 0–5)
NEUTROPHILS NFR BLD MANUAL: 99 % (ref 28–66)
OVALOCYTES BLD QL SMEAR: (no result)
PLATELET # BLD AUTO: 90 10^3/UL (ref 150–450)
PLATELET BLD QL SMEAR: (no result)
POTASSIUM SERPL-SCNC: 4.2 MEQ/L (ref 3.5–5.1)
PROT SERPL-MCNC: 5.1 GM/DL (ref 6.4–8.2)
RBC # BLD AUTO: 2.03 10^6/UL (ref 4.3–6.1)
SODIUM SERPL-SCNC: 140 MEQ/L (ref 136–145)
WBC # BLD AUTO: 9.2 10^3/UL (ref 4–10)

## 2020-02-20 PROCEDURE — 86850 RBC ANTIBODY SCREEN: CPT

## 2020-02-20 PROCEDURE — 86901 BLOOD TYPING SEROLOGIC RH(D): CPT

## 2020-02-20 PROCEDURE — 36415 COLL VENOUS BLD VENIPUNCTURE: CPT

## 2020-02-20 PROCEDURE — 36430 TRANSFUSION BLD/BLD COMPNT: CPT

## 2020-02-20 PROCEDURE — 86920 COMPATIBILITY TEST SPIN: CPT

## 2020-02-20 PROCEDURE — 85055 RETICULATED PLATELET ASSAY: CPT

## 2020-02-20 PROCEDURE — 85025 COMPLETE CBC W/AUTO DIFF WBC: CPT

## 2020-02-20 PROCEDURE — 85049 AUTOMATED PLATELET COUNT: CPT

## 2020-02-20 PROCEDURE — 80053 COMPREHEN METABOLIC PANEL: CPT

## 2020-02-20 PROCEDURE — 86900 BLOOD TYPING SEROLOGIC ABO: CPT

## 2020-02-24 ENCOUNTER — HOSPITAL ENCOUNTER (OUTPATIENT)
Dept: HOSPITAL 53 - M LABDRAWC | Age: 70
End: 2020-02-24
Attending: INTERNAL MEDICINE
Payer: MEDICARE

## 2020-02-24 DIAGNOSIS — C83.30: Primary | ICD-10-CM

## 2020-02-25 LAB
ALBUMIN SERPL BCG-MCNC: 3.5 GM/DL (ref 3.2–5.2)
ALT SERPL W P-5'-P-CCNC: 29 U/L (ref 12–78)
BASOPHILS NFR BLD MANUAL: 1 % (ref 0–1)
BILIRUB SERPL-MCNC: 0.5 MG/DL (ref 0.2–1)
BUN SERPL-MCNC: 11 MG/DL (ref 7–18)
CALCIUM SERPL-MCNC: 8.4 MG/DL (ref 8.8–10.2)
CHLORIDE SERPL-SCNC: 106 MEQ/L (ref 98–107)
CO2 SERPL-SCNC: 28 MEQ/L (ref 21–32)
CREAT SERPL-MCNC: 0.81 MG/DL (ref 0.7–1.3)
EOSINOPHIL NFR BLD MANUAL: 4 % (ref 0–3)
GFR SERPL CREATININE-BSD FRML MDRD: > 60 ML/MIN/{1.73_M2} (ref 42–?)
GLUCOSE SERPL-MCNC: 104 MG/DL (ref 70–100)
HCT VFR BLD AUTO: 25.5 % (ref 42–52)
HGB BLD-MCNC: 8.3 G/DL (ref 13.5–17.5)
LYMPHOCYTES NFR BLD MANUAL: 43 % (ref 16–44)
MCH RBC QN AUTO: 31.8 PG (ref 27–33)
MCHC RBC AUTO-ENTMCNC: 32.5 G/DL (ref 32–36.5)
MCV RBC AUTO: 97.7 FL (ref 80–96)
METAMYELOCYTES NFR BLD MANUAL: 4 % (ref 0–0)
MONOCYTES NFR BLD MANUAL: 9 % (ref 0–5)
MYELOBLASTS NFR BLD MANUAL: 2 % (ref 0–0)
NEUTROPHILS NFR BLD MANUAL: 10 % (ref 28–66)
PLATELET # BLD AUTO: 65 10^3/UL (ref 150–450)
PLATELET BLD QL SMEAR: (no result)
POTASSIUM SERPL-SCNC: 3.7 MEQ/L (ref 3.5–5.1)
PROMYELOCYTES # BLD MANUAL: 1 % (ref 0–0)
PROT SERPL-MCNC: 5.8 GM/DL (ref 6.4–8.2)
RBC # BLD AUTO: 2.61 10^6/UL (ref 4.3–6.1)
RBC MORPH BLD: NORMAL
SODIUM SERPL-SCNC: 140 MEQ/L (ref 136–145)
WBC # BLD AUTO: 4.4 10^3/UL (ref 4–10)

## 2020-02-26 ENCOUNTER — HOSPITAL ENCOUNTER (OUTPATIENT)
Dept: HOSPITAL 53 - M SHH | Age: 70
End: 2020-02-26
Attending: INTERNAL MEDICINE
Payer: MEDICARE

## 2020-02-26 DIAGNOSIS — C83.30: Primary | ICD-10-CM

## 2020-02-26 LAB
ALBUMIN SERPL BCG-MCNC: 3.3 GM/DL (ref 3.2–5.2)
ALT SERPL W P-5'-P-CCNC: 24 U/L (ref 12–78)
ANISOCYTOSIS BLD QL SMEAR: (no result)
BILIRUB SERPL-MCNC: 0.4 MG/DL (ref 0.2–1)
BUN SERPL-MCNC: 11 MG/DL (ref 7–18)
CALCIUM SERPL-MCNC: 8.6 MG/DL (ref 8.8–10.2)
CHLORIDE SERPL-SCNC: 107 MEQ/L (ref 98–107)
CO2 SERPL-SCNC: 28 MEQ/L (ref 21–32)
CREAT SERPL-MCNC: 0.95 MG/DL (ref 0.7–1.3)
EOSINOPHIL NFR BLD MANUAL: 1 % (ref 0–3)
GFR SERPL CREATININE-BSD FRML MDRD: > 60 ML/MIN/{1.73_M2} (ref 42–?)
GLUCOSE SERPL-MCNC: 120 MG/DL (ref 70–100)
HCT VFR BLD AUTO: 25.1 % (ref 42–52)
HGB BLD-MCNC: 7.9 G/DL (ref 13.5–17.5)
LYMPHOCYTES NFR BLD MANUAL: 3 % (ref 16–44)
MACROCYTES BLD QL SMEAR: (no result)
MCH RBC QN AUTO: 31.6 PG (ref 27–33)
MCHC RBC AUTO-ENTMCNC: 31.5 G/DL (ref 32–36.5)
MCV RBC AUTO: 100.4 FL (ref 80–96)
METAMYELOCYTES NFR BLD MANUAL: 5 % (ref 0–0)
MONOCYTES NFR BLD MANUAL: 4 % (ref 0–5)
MYELOBLASTS NFR BLD MANUAL: 3 % (ref 0–0)
NEUTROPHILS NFR BLD MANUAL: 75 % (ref 28–66)
PLATELET # BLD AUTO: 87 10^3/UL (ref 150–450)
PLATELET BLD QL SMEAR: (no result)
POTASSIUM SERPL-SCNC: 3.5 MEQ/L (ref 3.5–5.1)
PROT SERPL-MCNC: 5.5 GM/DL (ref 6.4–8.2)
RBC # BLD AUTO: 2.5 10^6/UL (ref 4.3–6.1)
SODIUM SERPL-SCNC: 142 MEQ/L (ref 136–145)
VARIANT LYMPHS NFR BLD MANUAL: 1 % (ref 0–5)
WBC # BLD AUTO: 11.1 10^3/UL (ref 4–10)

## 2020-03-09 ENCOUNTER — HOSPITAL ENCOUNTER (OUTPATIENT)
Dept: HOSPITAL 53 - M SHH | Age: 70
End: 2020-03-09
Attending: INTERNAL MEDICINE
Payer: MEDICARE

## 2020-03-09 DIAGNOSIS — C83.30: Primary | ICD-10-CM

## 2020-03-09 LAB
ALBUMIN SERPL BCG-MCNC: 3.1 GM/DL (ref 3.2–5.2)
ALT SERPL W P-5'-P-CCNC: 56 U/L (ref 12–78)
BASO STIPL BLD QL SMEAR: (no result)
BILIRUB SERPL-MCNC: 0.5 MG/DL (ref 0.2–1)
BUN SERPL-MCNC: 25 MG/DL (ref 7–18)
CALCIUM SERPL-MCNC: 8.1 MG/DL (ref 8.8–10.2)
CHLORIDE SERPL-SCNC: 106 MEQ/L (ref 98–107)
CO2 SERPL-SCNC: 31 MEQ/L (ref 21–32)
CREAT SERPL-MCNC: 0.59 MG/DL (ref 0.7–1.3)
DACRYOCYTES BLD QL SMEAR: (no result)
EOSINOPHIL NFR BLD MANUAL: 1 % (ref 0–3)
GFR SERPL CREATININE-BSD FRML MDRD: > 60 ML/MIN/{1.73_M2} (ref 42–?)
GLUCOSE SERPL-MCNC: 79 MG/DL (ref 70–100)
HCT VFR BLD AUTO: 23.9 % (ref 42–52)
HGB BLD-MCNC: 7.7 G/DL (ref 13.5–17.5)
LYMPHOCYTES NFR BLD MANUAL: 7 % (ref 16–44)
MCH RBC QN AUTO: 32 PG (ref 27–33)
MCHC RBC AUTO-ENTMCNC: 32.2 G/DL (ref 32–36.5)
MCV RBC AUTO: 99.2 FL (ref 80–96)
NEUTROPHILS NFR BLD MANUAL: 91 % (ref 28–66)
OVALOCYTES BLD QL SMEAR: (no result)
PLATELET # BLD AUTO: 154 10^3/UL (ref 150–450)
PLATELET BLD QL SMEAR: NORMAL
POTASSIUM SERPL-SCNC: 3.6 MEQ/L (ref 3.5–5.1)
PROT SERPL-MCNC: 5.1 GM/DL (ref 6.4–8.2)
RBC # BLD AUTO: 2.41 10^6/UL (ref 4.3–6.1)
SODIUM SERPL-SCNC: 141 MEQ/L (ref 136–145)
WBC # BLD AUTO: 4.6 10^3/UL (ref 4–10)

## 2020-03-11 ENCOUNTER — HOSPITAL ENCOUNTER (OUTPATIENT)
Dept: HOSPITAL 53 - M INFU | Age: 70
Discharge: HOME | End: 2020-03-11
Attending: INTERNAL MEDICINE
Payer: MEDICARE

## 2020-03-11 VITALS — DIASTOLIC BLOOD PRESSURE: 61 MMHG | SYSTOLIC BLOOD PRESSURE: 121 MMHG

## 2020-03-11 VITALS — WEIGHT: 175.93 LBS | BODY MASS INDEX: 29.31 KG/M2 | HEIGHT: 65 IN

## 2020-03-11 VITALS — SYSTOLIC BLOOD PRESSURE: 121 MMHG | DIASTOLIC BLOOD PRESSURE: 61 MMHG

## 2020-03-11 VITALS — DIASTOLIC BLOOD PRESSURE: 57 MMHG | SYSTOLIC BLOOD PRESSURE: 120 MMHG

## 2020-03-11 VITALS — SYSTOLIC BLOOD PRESSURE: 105 MMHG | DIASTOLIC BLOOD PRESSURE: 57 MMHG

## 2020-03-11 DIAGNOSIS — C83.30: ICD-10-CM

## 2020-03-11 DIAGNOSIS — D64.9: Primary | ICD-10-CM

## 2020-03-11 PROCEDURE — 86920 COMPATIBILITY TEST SPIN: CPT

## 2020-03-11 PROCEDURE — 86850 RBC ANTIBODY SCREEN: CPT

## 2020-03-11 PROCEDURE — 86900 BLOOD TYPING SEROLOGIC ABO: CPT

## 2020-03-11 PROCEDURE — 36430 TRANSFUSION BLD/BLD COMPNT: CPT

## 2020-03-11 PROCEDURE — 86901 BLOOD TYPING SEROLOGIC RH(D): CPT

## 2020-03-11 PROCEDURE — 36591 DRAW BLOOD OFF VENOUS DEVICE: CPT

## 2020-03-12 ENCOUNTER — HOSPITAL ENCOUNTER (OUTPATIENT)
Dept: HOSPITAL 53 - M SHH | Age: 70
End: 2020-03-12
Attending: INTERNAL MEDICINE
Payer: MEDICARE

## 2020-03-12 DIAGNOSIS — C83.30: Primary | ICD-10-CM

## 2020-03-12 LAB
ALBUMIN SERPL BCG-MCNC: 3.3 GM/DL (ref 3.2–5.2)
ALT SERPL W P-5'-P-CCNC: 33 U/L (ref 12–78)
BILIRUB SERPL-MCNC: 0.5 MG/DL (ref 0.2–1)
BUN SERPL-MCNC: 20 MG/DL (ref 7–18)
CALCIUM SERPL-MCNC: 8 MG/DL (ref 8.8–10.2)
CHLORIDE SERPL-SCNC: 107 MEQ/L (ref 98–107)
CO2 SERPL-SCNC: 29 MEQ/L (ref 21–32)
CREAT SERPL-MCNC: 0.64 MG/DL (ref 0.7–1.3)
DACRYOCYTES BLD QL SMEAR: (no result)
EOSINOPHIL NFR BLD MANUAL: 2 % (ref 0–3)
GFR SERPL CREATININE-BSD FRML MDRD: > 60 ML/MIN/{1.73_M2} (ref 42–?)
GLUCOSE SERPL-MCNC: 94 MG/DL (ref 70–100)
HCT VFR BLD AUTO: 25.6 % (ref 42–52)
HGB BLD-MCNC: 8.1 G/DL (ref 13.5–17.5)
LYMPHOCYTES NFR BLD MANUAL: 3 % (ref 16–44)
MACROCYTES BLD QL SMEAR: (no result)
MCH RBC QN AUTO: 31.6 PG (ref 27–33)
MCHC RBC AUTO-ENTMCNC: 31.6 G/DL (ref 32–36.5)
MCV RBC AUTO: 100 FL (ref 80–96)
NEUTROPHILS NFR BLD MANUAL: 95 % (ref 28–66)
PLATELET # BLD AUTO: 101 10^3/UL (ref 150–450)
PLATELET BLD QL SMEAR: (no result)
POIKILOCYTOSIS BLD QL SMEAR: (no result)
POTASSIUM SERPL-SCNC: 4.1 MEQ/L (ref 3.5–5.1)
PROT SERPL-MCNC: 5.6 GM/DL (ref 6.4–8.2)
RBC # BLD AUTO: 2.56 10^6/UL (ref 4.3–6.1)
SODIUM SERPL-SCNC: 140 MEQ/L (ref 136–145)
WBC # BLD AUTO: 9.9 10^3/UL (ref 4–10)

## 2020-03-16 ENCOUNTER — HOSPITAL ENCOUNTER (OUTPATIENT)
Dept: HOSPITAL 53 - M SHH | Age: 70
End: 2020-03-16
Attending: INTERNAL MEDICINE
Payer: MEDICARE

## 2020-03-16 DIAGNOSIS — C83.30: Primary | ICD-10-CM

## 2020-03-16 LAB
ALBUMIN SERPL BCG-MCNC: 3.4 GM/DL (ref 3.2–5.2)
ALT SERPL W P-5'-P-CCNC: 32 U/L (ref 12–78)
ANISOCYTOSIS BLD QL SMEAR: (no result)
BASOPHILS NFR BLD MANUAL: 5 % (ref 0–1)
BILIRUB SERPL-MCNC: 0.4 MG/DL (ref 0.2–1)
BUN SERPL-MCNC: 9 MG/DL (ref 7–18)
CALCIUM SERPL-MCNC: 8.6 MG/DL (ref 8.8–10.2)
CHLORIDE SERPL-SCNC: 107 MEQ/L (ref 98–107)
CO2 SERPL-SCNC: 29 MEQ/L (ref 21–32)
CREAT SERPL-MCNC: 0.87 MG/DL (ref 0.7–1.3)
DACRYOCYTES BLD QL SMEAR: (no result)
EOSINOPHIL NFR BLD MANUAL: 4 % (ref 0–3)
GFR SERPL CREATININE-BSD FRML MDRD: > 60 ML/MIN/{1.73_M2} (ref 42–?)
GLUCOSE SERPL-MCNC: 78 MG/DL (ref 70–100)
HCT VFR BLD AUTO: 22 % (ref 42–52)
HGB BLD-MCNC: 7.1 G/DL (ref 13.5–17.5)
HYPOCHROMIA BLD QL SMEAR: (no result)
LYMPHOCYTES NFR BLD MANUAL: 21 % (ref 16–44)
MCH RBC QN AUTO: 32 PG (ref 27–33)
MCHC RBC AUTO-ENTMCNC: 32.3 G/DL (ref 32–36.5)
MCV RBC AUTO: 99.1 FL (ref 80–96)
MONOCYTES NFR BLD MANUAL: 25 % (ref 0–5)
NEUTROPHILS NFR BLD MANUAL: 40 % (ref 28–66)
PLATELET # BLD AUTO: 55 10^3/UL (ref 150–450)
PLATELET BLD QL SMEAR: (no result)
POLYCHROMASIA BLD QL SMEAR: (no result)
POTASSIUM SERPL-SCNC: 4.1 MEQ/L (ref 3.5–5.1)
PROT SERPL-MCNC: 5.7 GM/DL (ref 6.4–8.2)
RBC # BLD AUTO: 2.22 10^6/UL (ref 4.3–6.1)
SODIUM SERPL-SCNC: 142 MEQ/L (ref 136–145)
WBC # BLD AUTO: 2.3 10^3/UL (ref 4–10)

## 2020-03-19 ENCOUNTER — HOSPITAL ENCOUNTER (OUTPATIENT)
Dept: HOSPITAL 53 - M SHH | Age: 70
End: 2020-03-19
Attending: INTERNAL MEDICINE
Payer: MEDICARE

## 2020-03-19 DIAGNOSIS — C83.30: Primary | ICD-10-CM

## 2020-03-19 LAB
ALBUMIN SERPL BCG-MCNC: 3.5 GM/DL (ref 3.2–5.2)
ALT SERPL W P-5'-P-CCNC: 27 U/L (ref 12–78)
ANISOCYTOSIS BLD QL SMEAR: (no result)
BILIRUB SERPL-MCNC: 0.4 MG/DL (ref 0.2–1)
BUN SERPL-MCNC: 11 MG/DL (ref 7–18)
CALCIUM SERPL-MCNC: 8.6 MG/DL (ref 8.8–10.2)
CHLORIDE SERPL-SCNC: 106 MEQ/L (ref 98–107)
CO2 SERPL-SCNC: 30 MEQ/L (ref 21–32)
CREAT SERPL-MCNC: 0.96 MG/DL (ref 0.7–1.3)
DACRYOCYTES BLD QL SMEAR: (no result)
EOSINOPHIL NFR BLD MANUAL: 6 % (ref 0–3)
GFR SERPL CREATININE-BSD FRML MDRD: > 60 ML/MIN/{1.73_M2} (ref 42–?)
GLUCOSE SERPL-MCNC: 132 MG/DL (ref 70–100)
HCT VFR BLD AUTO: 24.1 % (ref 42–52)
HGB BLD-MCNC: 7.7 G/DL (ref 13.5–17.5)
LYMPHOCYTES NFR BLD MANUAL: 5 % (ref 16–44)
MACROCYTES BLD QL SMEAR: (no result)
MCH RBC QN AUTO: 32.4 PG (ref 27–33)
MCHC RBC AUTO-ENTMCNC: 32 G/DL (ref 32–36.5)
MCV RBC AUTO: 101.3 FL (ref 80–96)
METAMYELOCYTES NFR BLD MANUAL: 3 % (ref 0–0)
MONOCYTES NFR BLD MANUAL: 2 % (ref 0–5)
MYELOBLASTS NFR BLD MANUAL: 1 % (ref 0–0)
NEUTROPHILS NFR BLD MANUAL: 77 % (ref 28–66)
PLATELET # BLD AUTO: 98 10^3/UL (ref 150–450)
PLATELET BLD QL SMEAR: (no result)
POTASSIUM SERPL-SCNC: 4 MEQ/L (ref 3.5–5.1)
PROMYELOCYTES # BLD MANUAL: 1 % (ref 0–0)
PROT SERPL-MCNC: 5.9 GM/DL (ref 6.4–8.2)
RBC # BLD AUTO: 2.38 10^6/UL (ref 4.3–6.1)
SODIUM SERPL-SCNC: 139 MEQ/L (ref 136–145)
TOXIC GRANULES BLD QL SMEAR: (no result)
VARIANT LYMPHS NFR BLD MANUAL: 2 % (ref 0–5)
WBC # BLD AUTO: 10.1 10^3/UL (ref 4–10)
WBC TOXIC VACUOLES BLD QL SMEAR: (no result)

## 2020-03-26 ENCOUNTER — HOSPITAL ENCOUNTER (OUTPATIENT)
Dept: HOSPITAL 53 - M SHH | Age: 70
End: 2020-03-26
Attending: INTERNAL MEDICINE
Payer: MEDICARE

## 2020-03-26 DIAGNOSIS — C83.30: Primary | ICD-10-CM

## 2020-03-26 LAB
ALBUMIN SERPL BCG-MCNC: 3.6 GM/DL (ref 3.2–5.2)
ALT SERPL W P-5'-P-CCNC: 29 U/L (ref 12–78)
BASOPHILS # BLD AUTO: 0.1 10^3/UL (ref 0–0.2)
BASOPHILS NFR BLD AUTO: 1.6 % (ref 0–1)
BILIRUB SERPL-MCNC: 0.4 MG/DL (ref 0.2–1)
BUN SERPL-MCNC: 25 MG/DL (ref 7–18)
CALCIUM SERPL-MCNC: 8.4 MG/DL (ref 8.8–10.2)
CHLORIDE SERPL-SCNC: 105 MEQ/L (ref 98–107)
CO2 SERPL-SCNC: 27 MEQ/L (ref 21–32)
CREAT SERPL-MCNC: 1.19 MG/DL (ref 0.7–1.3)
EOSINOPHIL # BLD AUTO: 0.1 10^3/UL (ref 0–0.5)
EOSINOPHIL NFR BLD AUTO: 2.2 % (ref 0–3)
GFR SERPL CREATININE-BSD FRML MDRD: > 60 ML/MIN/{1.73_M2} (ref 42–?)
GLUCOSE SERPL-MCNC: 105 MG/DL (ref 70–100)
HCT VFR BLD AUTO: 28.4 % (ref 42–52)
HGB BLD-MCNC: 9.1 G/DL (ref 13.5–17.5)
LYMPHOCYTES # BLD AUTO: 0.6 10^3/UL (ref 1.5–5)
LYMPHOCYTES NFR BLD AUTO: 10.1 % (ref 24–44)
MCH RBC QN AUTO: 32.5 PG (ref 27–33)
MCHC RBC AUTO-ENTMCNC: 32 G/DL (ref 32–36.5)
MCV RBC AUTO: 101.4 FL (ref 80–96)
MONOCYTES # BLD AUTO: 0.6 10^3/UL (ref 0–0.8)
MONOCYTES NFR BLD AUTO: 11.7 % (ref 0–5)
NEUTROPHILS # BLD AUTO: 4 10^3/UL (ref 1.5–8.5)
NEUTROPHILS NFR BLD AUTO: 73.1 % (ref 36–66)
PLATELET # BLD AUTO: 159 10^3/UL (ref 150–450)
POTASSIUM SERPL-SCNC: 4.9 MEQ/L (ref 3.5–5.1)
PROT SERPL-MCNC: 6.4 GM/DL (ref 6.4–8.2)
RBC # BLD AUTO: 2.8 10^6/UL (ref 4.3–6.1)
SODIUM SERPL-SCNC: 137 MEQ/L (ref 136–145)
WBC # BLD AUTO: 5.5 10^3/UL (ref 4–10)

## 2020-03-31 ENCOUNTER — HOSPITAL ENCOUNTER (OUTPATIENT)
Dept: HOSPITAL 53 - M PLARAD | Age: 70
End: 2020-03-31
Attending: INTERNAL MEDICINE
Payer: MEDICARE

## 2020-03-31 DIAGNOSIS — R16.0: Primary | ICD-10-CM

## 2020-03-31 DIAGNOSIS — C83.30: ICD-10-CM

## 2020-03-31 PROCEDURE — 78816 PET IMAGE W/CT FULL BODY: CPT

## 2020-03-31 NOTE — REP
PET/CT:

 

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 Chemotherapy.

 

Comparisons: Comparison PET/CT study October 22, 2019. Comparison CT study chest,

abdomen and pelvis January 9, 2020.

 

TECHNIQUE:

 

70 minutes following the intravenous injection of a 9.13 mCi dose of F-18 FDG,

three-dimensional PET scintigraphy is acquired from the skull vertex to the toes

. Triplanar noncontrast CT scanning is acquired through the same anatomic range

for attenuation correction, and image registration with scan parameters optimized

to minimize radiation exposure to the patient. PET scintigraphy and CT datasets

were fused and displayed on a workstation with multiplanar and projection display

capability.

 

PET/CT Findings: There is no abnormal hypermetabolic uptake in the head and neck

soft tissues.  A right-sided Ftpbet-A-Tpid catheter is noted.  No abnormal hilar

or mediastinal hypermetabolic uptake is seen in the chest.  No pulmonary

parenchymal hypermetabolic uptake is seen.

 

The liver and spleen are dramatically improved.  Nonhypermetabolic low density

areas are seen within the liver.  There is one area along the medial border of

the left lobe of the liver adjacent to the antrum of the stomach which shows

uptake slightly higher than background liver uptake.  This may be

gastrointestinal mucosal uptake.  Maximum standard uptake value here is 3.91.

This is similar in degree of uptake to gastrointestinal mucosal uptake elsewhere.

No other hypermetabolic focus is seen within the liver or spleen on today's

PET/CT. No abnormal anthony uptake is seen in the abdomen and pelvis.  PET

scintigraphy is otherwise unremarkable.

 

Impression:

 

Markedly improved PET scintigraphy.  Equivocal focus adjacent to or within the

periphery of the left lobe of the liver versus physiologic gastric mucosal

uptake.  Otherwise negative PET scintigraphy.

 

 

Electronically Signed by

Bernabe Castillo MD 03/31/2020 04:50 P

## 2020-06-23 ENCOUNTER — HOSPITAL ENCOUNTER (OUTPATIENT)
Dept: HOSPITAL 53 - M PLARAD | Age: 70
End: 2020-06-23
Attending: INTERNAL MEDICINE
Payer: MEDICARE

## 2020-06-23 DIAGNOSIS — C83.39: Primary | ICD-10-CM

## 2020-06-23 PROCEDURE — 78815 PET IMAGE W/CT SKULL-THIGH: CPT

## 2020-06-23 NOTE — REP
PET/CT:

 

HISTORY: Restaging diffuse large B-cell lymphoma. On chemotherapy.

 

COMPARISONS:  Comparison PET/CT study March 31, 2020. Comparison PET/CT is also

reviewed from October 22, 2019.

 

TECHNIQUE: 45 minutes following the intravenous injection of a 8.23 mCi dose of

F-18 FDG, three-dimensional PET scintigraphy is acquired from the skull base to

the proximal thighs. Triplanar noncontrast CT scanning is acquired through the

same anatomic range for attenuation correction, and image registration with scan

parameters optimized to minimize radiation exposure to the patient. PET

scintigraphy and CT datasets were fused and displayed on a workstation with

multiplanar and projection display capability.

 

PET/CT FINDINGS:  Head and neck soft tissues are unremarkable.  There is a

right-sided Tcvjbu-E-Incx catheter.  There is no abnormal hypermetabolic uptake

in the hilar or mediastinal structures.  No abnormal pulmonary parenchymal

hypermetabolic uptake is appreciated.

 

In the abdomen and pelvis, normal hepatic, splenic, gastrointestinal, and

genitourinary FDG accumulation is seen.  No abnormal hypermetabolic uptake is

seen in the abdomen or pelvis.  The previously noted left lobe liver uptake is

not seen.  No abnormal splenic uptake is seen.

 

IMPRESSION:

 

Negative PET scintigraphy.  No abnormal hypermetabolic uptake is appreciated.

 

 

Electronically Signed by

Bernabe Castillo MD 06/23/2020 12:45 P

## 2020-09-28 ENCOUNTER — HOSPITAL ENCOUNTER (OUTPATIENT)
Dept: HOSPITAL 53 - M PLARAD | Age: 70
End: 2020-09-28
Attending: INTERNAL MEDICINE
Payer: MEDICARE

## 2020-09-28 DIAGNOSIS — C83.39: Primary | ICD-10-CM

## 2020-09-28 PROCEDURE — 78815 PET IMAGE W/CT SKULL-THIGH: CPT

## 2020-10-07 NOTE — REP
PET CT 



HISTORY: Restaging large B-cell lymphoma. 



COMPARISON: PET CT is reviewed from 06/23/2020 and 10/22/2019. 



TECHNIQUE: 58 minutes following the intravenous injection of an 8.0 mCi dose of 
F18 fluorodeoxyglucose, three-dimension PET CT scanning is acquired from the 
skull base to the proximal thighs. 



PET CT FINDINGS: 

Head and neck soft tissues are unremarkable. No abnormal hypermetabolic uptake 
is seen within the thorax. No extrathoracic mass or adenopathy is seen. In the 
abdomen and pelvis, there is normal hepatic, splenic, gastrointestinal, and 
genitourinary FDG accumulation. There are areas of low density in the liver, but
no area of hypermetabolic uptake is seen. These findings are unchanged. There is
a small cyst in the left kidney. No abnormal hypermetabolic anthony uptake is seen
in the abdomen or pelvis.   



IMPRESSION: 

Negative PET scintigraphy. No abnormal hypermetabolic uptake seen.   

MTDD

## 2021-01-21 ENCOUNTER — HOSPITAL ENCOUNTER (OUTPATIENT)
Dept: HOSPITAL 53 - M RAD | Age: 71
End: 2021-01-21
Attending: INTERNAL MEDICINE
Payer: MEDICARE

## 2021-01-21 DIAGNOSIS — M75.31: Primary | ICD-10-CM

## 2021-04-05 ENCOUNTER — HOSPITAL ENCOUNTER (OUTPATIENT)
Dept: HOSPITAL 53 - M PLARAD | Age: 71
End: 2021-04-05
Attending: INTERNAL MEDICINE
Payer: MEDICARE

## 2021-04-05 DIAGNOSIS — R93.2: ICD-10-CM

## 2021-04-05 DIAGNOSIS — C83.38: Primary | ICD-10-CM

## 2021-04-05 PROCEDURE — 78815 PET IMAGE W/CT SKULL-THIGH: CPT

## 2021-04-05 NOTE — REP
INDICATION:

RESTAGING DLBCL C83.38.



COMPARISON:

PET-CT scan dated September 28, 2020 and chest/abdomen/pelvis CT dated 01/09/2020.



TECHNIQUE:

Whole body scanning is performed from the skull base to the upper thighs with 16.9 mCi

of F 18 FDG.



FINDINGS:

Neck and supraclavicular areas:

There are no hypermetabolic foci.



Chest:

There are no hypermetabolic foci.



Abdomen, pelvis and upper thighs:

There are no hypermetabolic foci.



On the CT accompanying the PET scan there are subtle areas of low density in the

liver, similar to the CT accompanying the PET scan 09/28/2020.  No hypodensities are

identified in the spleen.



IMPRESSION:

There are no hypermetabolic foci.

Subtle areas of hypodensity persist in the liver on the CT scan accompanying the PET

scan, unchanged.





<Electronically signed by Terry Pabon > 04/05/21 [id number]

## 2021-04-26 ENCOUNTER — HOSPITAL ENCOUNTER (OUTPATIENT)
Dept: HOSPITAL 53 - M SFHCCLAY | Age: 71
End: 2021-04-26
Attending: FAMILY MEDICINE
Payer: MEDICARE

## 2021-04-26 DIAGNOSIS — Z01.818: Primary | ICD-10-CM

## 2021-04-26 DIAGNOSIS — I11.9: ICD-10-CM

## 2021-04-26 LAB
BUN SERPL-MCNC: 22 MG/DL (ref 7–18)
CALCIUM SERPL-MCNC: 9 MG/DL (ref 8.8–10.2)
CHLORIDE SERPL-SCNC: 104 MEQ/L (ref 98–107)
CO2 SERPL-SCNC: 28 MEQ/L (ref 21–32)
CREAT SERPL-MCNC: 0.83 MG/DL (ref 0.7–1.3)
GFR SERPL CREATININE-BSD FRML MDRD: > 60 ML/MIN/{1.73_M2} (ref 42–?)
GLUCOSE SERPL-MCNC: 95 MG/DL (ref 70–100)
POTASSIUM SERPL-SCNC: 4.6 MEQ/L (ref 3.5–5.1)
SODIUM SERPL-SCNC: 139 MEQ/L (ref 136–145)

## 2021-04-26 PROCEDURE — 80048 BASIC METABOLIC PNL TOTAL CA: CPT

## 2021-05-06 ENCOUNTER — HOSPITAL ENCOUNTER (OUTPATIENT)
Dept: HOSPITAL 53 - M LABSMTC | Age: 71
End: 2021-05-06
Attending: OPHTHALMOLOGY
Payer: MEDICARE

## 2021-05-06 DIAGNOSIS — Z20.822: ICD-10-CM

## 2021-05-06 DIAGNOSIS — Z01.818: Primary | ICD-10-CM

## 2022-02-16 ENCOUNTER — HOSPITAL ENCOUNTER (OUTPATIENT)
Dept: HOSPITAL 53 - M RAD | Age: 72
End: 2022-02-16
Attending: INTERNAL MEDICINE
Payer: MEDICARE

## 2022-02-16 DIAGNOSIS — C85.90: ICD-10-CM

## 2022-02-16 DIAGNOSIS — H92.01: Primary | ICD-10-CM

## 2022-05-04 ENCOUNTER — HOSPITAL ENCOUNTER (OUTPATIENT)
Dept: HOSPITAL 53 - M SFHCCLAY | Age: 72
End: 2022-05-04
Attending: FAMILY MEDICINE
Payer: MEDICARE

## 2022-05-04 DIAGNOSIS — I11.9: Primary | ICD-10-CM

## 2022-05-05 LAB
BUN SERPL-MCNC: 16 MG/DL (ref 7–18)
CALCIUM SERPL-MCNC: 9.5 MG/DL (ref 8.8–10.2)
CHLORIDE SERPL-SCNC: 107 MEQ/L (ref 98–107)
CO2 SERPL-SCNC: 27 MEQ/L (ref 21–32)
CREAT SERPL-MCNC: 0.88 MG/DL (ref 0.7–1.3)
GFR SERPL CREATININE-BSD FRML MDRD: > 60 ML/MIN/{1.73_M2} (ref 42–?)
GLUCOSE SERPL-MCNC: 94 MG/DL (ref 70–100)
POTASSIUM SERPL-SCNC: 4.3 MEQ/L (ref 3.5–5.1)
SODIUM SERPL-SCNC: 139 MEQ/L (ref 136–145)

## 2022-08-08 ENCOUNTER — HOSPITAL ENCOUNTER (OUTPATIENT)
Dept: HOSPITAL 53 - M RAD | Age: 72
End: 2022-08-08
Attending: INTERNAL MEDICINE
Payer: MEDICARE

## 2022-08-08 DIAGNOSIS — C85.90: Primary | ICD-10-CM

## 2022-08-08 PROCEDURE — 70491 CT SOFT TISSUE NECK W/DYE: CPT

## 2022-08-08 PROCEDURE — 74177 CT ABD & PELVIS W/CONTRAST: CPT

## 2022-08-08 PROCEDURE — 71260 CT THORAX DX C+: CPT

## 2023-02-02 ENCOUNTER — HOSPITAL ENCOUNTER (OUTPATIENT)
Dept: HOSPITAL 53 - M SFHCCLAY | Age: 73
End: 2023-02-02
Payer: MEDICARE

## 2023-02-02 DIAGNOSIS — E78.2: ICD-10-CM

## 2023-02-02 DIAGNOSIS — I10: Primary | ICD-10-CM

## 2023-02-02 LAB
ALBUMIN SERPL BCG-MCNC: 3.9 G/DL (ref 3.2–5.2)
ALP SERPL-CCNC: 66 U/L (ref 46–116)
ALT SERPL W P-5'-P-CCNC: 27 U/L (ref 7–40)
AST SERPL-CCNC: 22 U/L (ref ?–34)
BILIRUB SERPL-MCNC: 0.7 MG/DL (ref 0.3–1.2)
BUN SERPL-MCNC: 13 MG/DL (ref 9–23)
CALCIUM SERPL-MCNC: 9.2 MG/DL (ref 8.3–10.6)
CHLORIDE SERPL-SCNC: 103 MMOL/L (ref 98–107)
CHOLEST SERPL-MCNC: 184 MG/DL (ref ?–200)
CHOLEST/HDLC SERPL: 3.86 {RATIO} (ref ?–5)
CO2 SERPL-SCNC: 29 MMOL/L (ref 20–31)
CREAT SERPL-MCNC: 0.92 MG/DL (ref 0.7–1.3)
GFR SERPL CREATININE-BSD FRML MDRD: > 60 ML/MIN/{1.73_M2} (ref 42–?)
GLUCOSE SERPL-MCNC: 80 MG/DL (ref 74–106)
HDLC SERPL-MCNC: 47.6 MG/DL (ref 40–?)
LDLC SERPL CALC-MCNC: 111.8 MG/DL (ref ?–100)
NONHDLC SERPL-MCNC: 136 MG/DL
POTASSIUM SERPL-SCNC: 4.5 MMOL/L (ref 3.5–5.1)
PROT SERPL-MCNC: 6.6 G/DL (ref 5.7–8.2)
SODIUM SERPL-SCNC: 138 MMOL/L (ref 136–145)
TRIGL SERPL-MCNC: 123 MG/DL (ref ?–150)

## 2023-10-17 ENCOUNTER — HOSPITAL ENCOUNTER (OUTPATIENT)
Dept: HOSPITAL 53 - M CLY | Age: 73
End: 2023-10-17
Payer: MEDICARE

## 2023-10-17 DIAGNOSIS — M75.31: Primary | ICD-10-CM

## 2023-10-17 DIAGNOSIS — M76.891: ICD-10-CM

## 2024-07-02 NOTE — REP
INDICATION:

SHOULDER PAIN- RIGHT.



COMPARISON:

None.



TECHNIQUE:

Three views.



FINDINGS:

The right glenohumeral and acromioclavicular joints are normally aligned.  There are 2

fairly large periarticular soft tissue calcifications at the superior aspect of the

shoulder consistent with calcific tendinitis or bursitis.  There is also a large

inferiorly positioned acromion process spur.  Some AC joint spurring is noted as well.

These findings may reflect impingement.  No fracture is seen.  A right-sided

Uaqfrk-J-Tsze catheter is noted.  The visualized right rib cage is unremarkable.



IMPRESSION:

Dystrophic soft tissue calcifications consistent with calcific tendinitis or bursitis.

Acromion process and AC joint spurring.  No acute bony abnormality.





<Electronically signed by Yaron Castillo > 01/21/21 [id number] What Is Your Reason For Requesting A Follow-Up Appointment?: Psoriasis

## 2024-10-22 ENCOUNTER — HOSPITAL ENCOUNTER (OUTPATIENT)
Dept: HOSPITAL 53 - M IRPRO | Age: 74
End: 2024-10-22
Attending: PHYSICIAN ASSISTANT
Payer: MEDICARE

## 2024-10-22 VITALS — TEMPERATURE: 97.4 F

## 2024-10-22 VITALS — SYSTOLIC BLOOD PRESSURE: 124 MMHG | DIASTOLIC BLOOD PRESSURE: 70 MMHG | OXYGEN SATURATION: 98 %

## 2024-10-22 VITALS — BODY MASS INDEX: 30.8 KG/M2 | HEIGHT: 67 IN | WEIGHT: 196.21 LBS

## 2024-10-22 DIAGNOSIS — R59.0: Primary | ICD-10-CM

## 2024-10-22 DIAGNOSIS — R16.1: ICD-10-CM

## 2024-10-22 PROCEDURE — 99152 MOD SED SAME PHYS/QHP 5/>YRS: CPT

## 2024-10-22 PROCEDURE — 36590 REMOVAL TUNNELED CV CATH: CPT

## 2024-10-22 RX ADMIN — SODIUM CHLORIDE SCH MLS/HR: 9 INJECTION, SOLUTION INTRAVENOUS at 11:48

## 2024-10-22 RX ADMIN — CEFAZOLIN SODIUM ONE MLS/HR: 2 SOLUTION INTRAVENOUS at 11:48

## 2025-01-16 ENCOUNTER — HOSPITAL ENCOUNTER (OUTPATIENT)
Dept: HOSPITAL 53 - M LAB REF | Age: 75
End: 2025-01-16
Attending: STUDENT IN AN ORGANIZED HEALTH CARE EDUCATION/TRAINING PROGRAM
Payer: MEDICARE

## 2025-01-16 DIAGNOSIS — I10: Primary | ICD-10-CM

## 2025-01-16 LAB
BUN SERPL-MCNC: 37 MG/DL (ref 9–23)
CALCIUM SERPL-MCNC: 9.1 MG/DL (ref 8.3–10.6)
CHLORIDE SERPL-SCNC: 96 MMOL/L (ref 98–107)
CO2 SERPL-SCNC: 28 MMOL/L (ref 20–31)
CREAT SERPL-MCNC: 1.26 MG/DL (ref 0.7–1.3)
CREAT UR-MCNC: 95.5 MG/DL
GFR SERPL CREATININE-BSD FRML MDRD: 59.2 ML/MIN/{1.73_M2} (ref 42–?)
GLUCOSE SERPL-MCNC: 112 MG/DL (ref 74–106)
MICROALBUMIN UR-MCNC: < 3 MG/L
POTASSIUM SERPL-SCNC: 4 MMOL/L (ref 3.5–5.1)
SODIUM SERPL-SCNC: 134 MMOL/L (ref 136–145)

## 2025-07-07 ENCOUNTER — HOSPITAL ENCOUNTER (OUTPATIENT)
Dept: HOSPITAL 53 - M PLARAD | Age: 75
End: 2025-07-07
Payer: MEDICARE

## 2025-07-07 DIAGNOSIS — C83.38: Primary | ICD-10-CM

## 2025-07-07 PROCEDURE — 78815 PET IMAGE W/CT SKULL-THIGH: CPT
